# Patient Record
Sex: FEMALE | Race: ASIAN | NOT HISPANIC OR LATINO | URBAN - METROPOLITAN AREA
[De-identification: names, ages, dates, MRNs, and addresses within clinical notes are randomized per-mention and may not be internally consistent; named-entity substitution may affect disease eponyms.]

---

## 2023-11-07 ENCOUNTER — EMERGENCY (EMERGENCY)
Facility: HOSPITAL | Age: 33
LOS: 1 days | Discharge: HOME | End: 2023-11-07
Attending: EMERGENCY MEDICINE | Admitting: EMERGENCY MEDICINE
Payer: MEDICAID

## 2023-11-07 VITALS
SYSTOLIC BLOOD PRESSURE: 100 MMHG | OXYGEN SATURATION: 100 % | HEART RATE: 78 BPM | DIASTOLIC BLOOD PRESSURE: 59 MMHG | RESPIRATION RATE: 18 BRPM | TEMPERATURE: 99 F

## 2023-11-07 PROCEDURE — 99284 EMERGENCY DEPT VISIT MOD MDM: CPT

## 2023-11-07 NOTE — ED ADULT TRIAGE NOTE - CHIEF COMPLAINT QUOTE
right sided ab pain/cramping since last night with 1 episode of spotting today, PT is about one month pregnant. Of Note PT also reports a rash with hives that comes every morning and lessens throughout the day x 4 days. No Hx reported.

## 2023-11-08 VITALS
DIASTOLIC BLOOD PRESSURE: 81 MMHG | SYSTOLIC BLOOD PRESSURE: 94 MMHG | TEMPERATURE: 99 F | OXYGEN SATURATION: 100 % | HEART RATE: 65 BPM | RESPIRATION RATE: 16 BRPM

## 2023-11-08 LAB
ALBUMIN SERPL ELPH-MCNC: 4.1 G/DL — SIGNIFICANT CHANGE UP (ref 3.3–5)
ALBUMIN SERPL ELPH-MCNC: 4.1 G/DL — SIGNIFICANT CHANGE UP (ref 3.3–5)
ALP SERPL-CCNC: 67 U/L — SIGNIFICANT CHANGE UP (ref 40–120)
ALP SERPL-CCNC: 67 U/L — SIGNIFICANT CHANGE UP (ref 40–120)
ALT FLD-CCNC: 25 U/L — SIGNIFICANT CHANGE UP (ref 4–33)
ALT FLD-CCNC: 25 U/L — SIGNIFICANT CHANGE UP (ref 4–33)
ANION GAP SERPL CALC-SCNC: 12 MMOL/L — SIGNIFICANT CHANGE UP (ref 7–14)
ANION GAP SERPL CALC-SCNC: 12 MMOL/L — SIGNIFICANT CHANGE UP (ref 7–14)
APPEARANCE UR: CLEAR — SIGNIFICANT CHANGE UP
APPEARANCE UR: CLEAR — SIGNIFICANT CHANGE UP
AST SERPL-CCNC: 16 U/L — SIGNIFICANT CHANGE UP (ref 4–32)
AST SERPL-CCNC: 16 U/L — SIGNIFICANT CHANGE UP (ref 4–32)
BACTERIA # UR AUTO: NEGATIVE /HPF — SIGNIFICANT CHANGE UP
BACTERIA # UR AUTO: NEGATIVE /HPF — SIGNIFICANT CHANGE UP
BASOPHILS # BLD AUTO: 0.06 K/UL — SIGNIFICANT CHANGE UP (ref 0–0.2)
BASOPHILS # BLD AUTO: 0.06 K/UL — SIGNIFICANT CHANGE UP (ref 0–0.2)
BASOPHILS NFR BLD AUTO: 0.5 % — SIGNIFICANT CHANGE UP (ref 0–2)
BASOPHILS NFR BLD AUTO: 0.5 % — SIGNIFICANT CHANGE UP (ref 0–2)
BILIRUB SERPL-MCNC: <0.2 MG/DL — SIGNIFICANT CHANGE UP (ref 0.2–1.2)
BILIRUB SERPL-MCNC: <0.2 MG/DL — SIGNIFICANT CHANGE UP (ref 0.2–1.2)
BILIRUB UR-MCNC: NEGATIVE — SIGNIFICANT CHANGE UP
BILIRUB UR-MCNC: NEGATIVE — SIGNIFICANT CHANGE UP
BLD GP AB SCN SERPL QL: NEGATIVE — SIGNIFICANT CHANGE UP
BLD GP AB SCN SERPL QL: NEGATIVE — SIGNIFICANT CHANGE UP
BUN SERPL-MCNC: 13 MG/DL — SIGNIFICANT CHANGE UP (ref 7–23)
BUN SERPL-MCNC: 13 MG/DL — SIGNIFICANT CHANGE UP (ref 7–23)
CALCIUM SERPL-MCNC: 9.1 MG/DL — SIGNIFICANT CHANGE UP (ref 8.4–10.5)
CALCIUM SERPL-MCNC: 9.1 MG/DL — SIGNIFICANT CHANGE UP (ref 8.4–10.5)
CAST: 0 /LPF — SIGNIFICANT CHANGE UP (ref 0–4)
CAST: 0 /LPF — SIGNIFICANT CHANGE UP (ref 0–4)
CHLORIDE SERPL-SCNC: 100 MMOL/L — SIGNIFICANT CHANGE UP (ref 98–107)
CHLORIDE SERPL-SCNC: 100 MMOL/L — SIGNIFICANT CHANGE UP (ref 98–107)
CO2 SERPL-SCNC: 23 MMOL/L — SIGNIFICANT CHANGE UP (ref 22–31)
CO2 SERPL-SCNC: 23 MMOL/L — SIGNIFICANT CHANGE UP (ref 22–31)
COLOR SPEC: YELLOW — SIGNIFICANT CHANGE UP
COLOR SPEC: YELLOW — SIGNIFICANT CHANGE UP
CREAT SERPL-MCNC: 0.54 MG/DL — SIGNIFICANT CHANGE UP (ref 0.5–1.3)
CREAT SERPL-MCNC: 0.54 MG/DL — SIGNIFICANT CHANGE UP (ref 0.5–1.3)
DIFF PNL FLD: NEGATIVE — SIGNIFICANT CHANGE UP
DIFF PNL FLD: NEGATIVE — SIGNIFICANT CHANGE UP
EGFR: 127 ML/MIN/1.73M2 — SIGNIFICANT CHANGE UP
EGFR: 127 ML/MIN/1.73M2 — SIGNIFICANT CHANGE UP
EOSINOPHIL # BLD AUTO: 0.17 K/UL — SIGNIFICANT CHANGE UP (ref 0–0.5)
EOSINOPHIL # BLD AUTO: 0.17 K/UL — SIGNIFICANT CHANGE UP (ref 0–0.5)
EOSINOPHIL NFR BLD AUTO: 1.4 % — SIGNIFICANT CHANGE UP (ref 0–6)
EOSINOPHIL NFR BLD AUTO: 1.4 % — SIGNIFICANT CHANGE UP (ref 0–6)
GLUCOSE SERPL-MCNC: 119 MG/DL — HIGH (ref 70–99)
GLUCOSE SERPL-MCNC: 119 MG/DL — HIGH (ref 70–99)
GLUCOSE UR QL: NEGATIVE MG/DL — SIGNIFICANT CHANGE UP
GLUCOSE UR QL: NEGATIVE MG/DL — SIGNIFICANT CHANGE UP
HCG SERPL-ACNC: SIGNIFICANT CHANGE UP MIU/ML
HCG SERPL-ACNC: SIGNIFICANT CHANGE UP MIU/ML
HCT VFR BLD CALC: 33.9 % — LOW (ref 34.5–45)
HCT VFR BLD CALC: 33.9 % — LOW (ref 34.5–45)
HGB BLD-MCNC: 11.4 G/DL — LOW (ref 11.5–15.5)
HGB BLD-MCNC: 11.4 G/DL — LOW (ref 11.5–15.5)
IANC: 7.83 K/UL — HIGH (ref 1.8–7.4)
IANC: 7.83 K/UL — HIGH (ref 1.8–7.4)
IMM GRANULOCYTES NFR BLD AUTO: 0.3 % — SIGNIFICANT CHANGE UP (ref 0–0.9)
IMM GRANULOCYTES NFR BLD AUTO: 0.3 % — SIGNIFICANT CHANGE UP (ref 0–0.9)
KETONES UR-MCNC: NEGATIVE MG/DL — SIGNIFICANT CHANGE UP
KETONES UR-MCNC: NEGATIVE MG/DL — SIGNIFICANT CHANGE UP
LEUKOCYTE ESTERASE UR-ACNC: NEGATIVE — SIGNIFICANT CHANGE UP
LEUKOCYTE ESTERASE UR-ACNC: NEGATIVE — SIGNIFICANT CHANGE UP
LYMPHOCYTES # BLD AUTO: 26.8 % — SIGNIFICANT CHANGE UP (ref 13–44)
LYMPHOCYTES # BLD AUTO: 26.8 % — SIGNIFICANT CHANGE UP (ref 13–44)
LYMPHOCYTES # BLD AUTO: 3.23 K/UL — SIGNIFICANT CHANGE UP (ref 1–3.3)
LYMPHOCYTES # BLD AUTO: 3.23 K/UL — SIGNIFICANT CHANGE UP (ref 1–3.3)
MCHC RBC-ENTMCNC: 29.8 PG — SIGNIFICANT CHANGE UP (ref 27–34)
MCHC RBC-ENTMCNC: 29.8 PG — SIGNIFICANT CHANGE UP (ref 27–34)
MCHC RBC-ENTMCNC: 33.6 GM/DL — SIGNIFICANT CHANGE UP (ref 32–36)
MCHC RBC-ENTMCNC: 33.6 GM/DL — SIGNIFICANT CHANGE UP (ref 32–36)
MCV RBC AUTO: 88.5 FL — SIGNIFICANT CHANGE UP (ref 80–100)
MCV RBC AUTO: 88.5 FL — SIGNIFICANT CHANGE UP (ref 80–100)
MONOCYTES # BLD AUTO: 0.72 K/UL — SIGNIFICANT CHANGE UP (ref 0–0.9)
MONOCYTES # BLD AUTO: 0.72 K/UL — SIGNIFICANT CHANGE UP (ref 0–0.9)
MONOCYTES NFR BLD AUTO: 6 % — SIGNIFICANT CHANGE UP (ref 2–14)
MONOCYTES NFR BLD AUTO: 6 % — SIGNIFICANT CHANGE UP (ref 2–14)
NEUTROPHILS # BLD AUTO: 7.83 K/UL — HIGH (ref 1.8–7.4)
NEUTROPHILS # BLD AUTO: 7.83 K/UL — HIGH (ref 1.8–7.4)
NEUTROPHILS NFR BLD AUTO: 65 % — SIGNIFICANT CHANGE UP (ref 43–77)
NEUTROPHILS NFR BLD AUTO: 65 % — SIGNIFICANT CHANGE UP (ref 43–77)
NITRITE UR-MCNC: NEGATIVE — SIGNIFICANT CHANGE UP
NITRITE UR-MCNC: NEGATIVE — SIGNIFICANT CHANGE UP
NRBC # BLD: 0 /100 WBCS — SIGNIFICANT CHANGE UP (ref 0–0)
NRBC # BLD: 0 /100 WBCS — SIGNIFICANT CHANGE UP (ref 0–0)
NRBC # FLD: 0 K/UL — SIGNIFICANT CHANGE UP (ref 0–0)
NRBC # FLD: 0 K/UL — SIGNIFICANT CHANGE UP (ref 0–0)
PH UR: 6.5 — SIGNIFICANT CHANGE UP (ref 5–8)
PH UR: 6.5 — SIGNIFICANT CHANGE UP (ref 5–8)
PLATELET # BLD AUTO: 286 K/UL — SIGNIFICANT CHANGE UP (ref 150–400)
PLATELET # BLD AUTO: 286 K/UL — SIGNIFICANT CHANGE UP (ref 150–400)
POTASSIUM SERPL-MCNC: 3.8 MMOL/L — SIGNIFICANT CHANGE UP (ref 3.5–5.3)
POTASSIUM SERPL-MCNC: 3.8 MMOL/L — SIGNIFICANT CHANGE UP (ref 3.5–5.3)
POTASSIUM SERPL-SCNC: 3.8 MMOL/L — SIGNIFICANT CHANGE UP (ref 3.5–5.3)
POTASSIUM SERPL-SCNC: 3.8 MMOL/L — SIGNIFICANT CHANGE UP (ref 3.5–5.3)
PROT SERPL-MCNC: 7.1 G/DL — SIGNIFICANT CHANGE UP (ref 6–8.3)
PROT SERPL-MCNC: 7.1 G/DL — SIGNIFICANT CHANGE UP (ref 6–8.3)
PROT UR-MCNC: NEGATIVE MG/DL — SIGNIFICANT CHANGE UP
PROT UR-MCNC: NEGATIVE MG/DL — SIGNIFICANT CHANGE UP
RBC # BLD: 3.83 M/UL — SIGNIFICANT CHANGE UP (ref 3.8–5.2)
RBC # BLD: 3.83 M/UL — SIGNIFICANT CHANGE UP (ref 3.8–5.2)
RBC # FLD: 13 % — SIGNIFICANT CHANGE UP (ref 10.3–14.5)
RBC # FLD: 13 % — SIGNIFICANT CHANGE UP (ref 10.3–14.5)
RBC CASTS # UR COMP ASSIST: 0 /HPF — SIGNIFICANT CHANGE UP (ref 0–4)
RBC CASTS # UR COMP ASSIST: 0 /HPF — SIGNIFICANT CHANGE UP (ref 0–4)
RH IG SCN BLD-IMP: POSITIVE — SIGNIFICANT CHANGE UP
RH IG SCN BLD-IMP: POSITIVE — SIGNIFICANT CHANGE UP
SODIUM SERPL-SCNC: 135 MMOL/L — SIGNIFICANT CHANGE UP (ref 135–145)
SODIUM SERPL-SCNC: 135 MMOL/L — SIGNIFICANT CHANGE UP (ref 135–145)
SP GR SPEC: 1.01 — SIGNIFICANT CHANGE UP (ref 1–1.03)
SP GR SPEC: 1.01 — SIGNIFICANT CHANGE UP (ref 1–1.03)
SQUAMOUS # UR AUTO: 1 /HPF — SIGNIFICANT CHANGE UP (ref 0–5)
SQUAMOUS # UR AUTO: 1 /HPF — SIGNIFICANT CHANGE UP (ref 0–5)
UROBILINOGEN FLD QL: 0.2 MG/DL — SIGNIFICANT CHANGE UP (ref 0.2–1)
UROBILINOGEN FLD QL: 0.2 MG/DL — SIGNIFICANT CHANGE UP (ref 0.2–1)
WBC # BLD: 12.05 K/UL — HIGH (ref 3.8–10.5)
WBC # BLD: 12.05 K/UL — HIGH (ref 3.8–10.5)
WBC # FLD AUTO: 12.05 K/UL — HIGH (ref 3.8–10.5)
WBC # FLD AUTO: 12.05 K/UL — HIGH (ref 3.8–10.5)
WBC UR QL: 0 /HPF — SIGNIFICANT CHANGE UP (ref 0–5)
WBC UR QL: 0 /HPF — SIGNIFICANT CHANGE UP (ref 0–5)

## 2023-11-08 PROCEDURE — 76817 TRANSVAGINAL US OBSTETRIC: CPT | Mod: 26

## 2023-11-08 NOTE — ED ADULT NURSE REASSESSMENT NOTE - NS ED NURSE REASSESS COMMENT FT1
Patient resting in stretcher, A&O x 4, ambulatory without assistance, offers no complaints of pain or discomfort at this time. RR equal and unlabored, speaking in clear sentences, VS stable. Safety measures maintained, family member at bedside, awaiting urine sample at this time.
Pt resting in stretcher, A&O x 4, offers no complaints of pain or discomfort at this time. RR equal and unlabored, speaking in clear sentences. BP 94/81, denies lightheadedness, dizziness, HA, chest pain, numbness/tingling. Safety measures maintained, comfort provided, family member at bedside, awaiting results at this time.

## 2023-11-08 NOTE — ED ADULT NURSE NOTE - OBJECTIVE STATEMENT
Breakcoverage RN Note: REX. Received pt to TRB.  at bedside. Pt reports " I am pregnant lmp 9/21, since yesterday am I have cramping and light spotting. Then I also notice this rash that has been coming and going over night into morning...it  may come on leg then goes then pops up on chest, goes away....this is going on 3 days...it it itchy when present. Right now it is not present." vss positioned for comfort.

## 2023-11-08 NOTE — ED PROVIDER NOTE - CLINICAL SUMMARY MEDICAL DECISION MAKING FREE TEXT BOX
32-year-old female  LMP 2023 that presents with abdominal pain in the right lower quadrant right pelvic area.  No rebound or guarding and nontender on exam.  Appears well.  Due to patient's recent finding of home pregnancy positive never had imaging the concern is #1 for ectopic pregnancy versus UTI versus yeast infection versus appendicitis given the location of the pain.  Although it is lower in the pelvic region.  Unlikely torsion may be cyst or ruptured cyst.  At this time will obtain transvaginal ultrasound and labs and urinalysis and culture and reassess.  Patient pain is minimal at this time and does not appear to be in acute distress with normal vital signs.  Of note patient's first pregnancy was complicated by gestational diabetes but patient is no longer on any medications as it resolved after her delivery.

## 2023-11-08 NOTE — ED PROVIDER NOTE - PHYSICAL EXAMINATION
Well-appearing no acute distress abdomen soft nontender with no CVA tenderness bilaterally and negative McBurney's negative Oliva's.  No rebound or guarding.  No masses palpable.  Patient appears comfortable otherwise.

## 2023-11-08 NOTE — ED PROVIDER NOTE - OBJECTIVE STATEMENT
32-year-old female G2, P1 LMP September 21, 2023 that presents with abdominal pain.  Patient states that she recently found out she was pregnant by home pregnancy and went to the doctor and found out confirmatory that she is pregnant but has not had any imaging.  Today she started having some abdominal pain in the morning right sided lower quadrant minimally dull achy nonradiating.  She did not think anything of it then later in the afternoon around 4-5 which is approximately 8 hours prior to arrival started having some vaginal bleeding when she wiped associated with continued pelvic cramping so came to the ED for evaluation.  Denies any nausea, vomiting, diarrhea, fevers, chills.  No recent trauma or rashes.  Patient states that she feels like she has a urinary tract infection but has itching in her pelvic region and white discharge.  Never had an STI in the past.  Sexually active with just one male partner her  who is at bedside as well.  Non-smoker no drinking or drugs.

## 2023-11-08 NOTE — ED PROVIDER NOTE - PROGRESS NOTE DETAILS
JOSE L: Patient was reevaluated and has no pain at this time.  Her labs were obtained and her hCG is 21152.0.  UA is negative for infections or blood at this time.  Patient is a positive.  Her ultrasound shows single IUP P with a gestational age estimated at 6 weeks and 5 days by crown-rump length.  There is a small subchorionic hematoma which was related to the patient.  Information was given to her and her  to review.  They do have an OB/GYN that they can follow-up with outpatient.  Will discharge patient at this time her condition is most likely secondary to normal pregnancy.  I explained to patient that if she starts having fevers or chills or GI issues due to her right lower quadrant pain concern for appendicitis that she should return to the hospital for further treatment or evaluation but at this time due to her fetus and no pain or symptoms and no recent fevers unlikely appendicitis.  Unlikely UTI.  Most likely secondary to normal pregnancy.

## 2023-11-08 NOTE — ED ADULT NURSE NOTE - CAS EDP DISCH TYPE
Ice 15 to 20 minutes 2-3 times daily for the next 48 hours.  It is very important that you keep the hand above your heart elevated to decrease swelling.  Follow-up with the bone specialist listed above within the week.  Take prescribed medication as directed.  Return to the emergency department for any other problems/concerns.  
Home

## 2023-11-08 NOTE — ED PROVIDER NOTE - NSFOLLOWUPINSTRUCTIONS_ED_ALL_ED_FT
You are seen in the emergency department for pelvic pain.  We obtained labs and imaging as well as check your urine.  Your urine shows no infections and your blood test do show that you are pregnant and your ultrasound came back showing that you are pregnant around 6 weeks and 5 days with the baby in the uterus.  We also found that you have something called a subchorionic hematoma which you should read about and follow-up with your OB/GYN for this.  There is nothing to do at this time regarding the subchorionic hematoma it is just monitoring to make sure that it does not worsening over time which can lead to bleeding.    Your blood type is a positive please remember this for the future.    Please take your results from today's testing in the emergency department follow-up with your OB/GYN as soon as possible.    If you start having fevers, chills, nausea, vomiting, diarrhea or worsening pain please return to the emergency department for further evaluation.

## 2023-11-08 NOTE — ED PROVIDER NOTE - PATIENT PORTAL LINK FT
You can access the FollowMyHealth Patient Portal offered by [facility] by registering at the following website: [email or web address]. By joining [employer]’s FollowMyHealth portal, you will also be able to view your health information using other applications (apps) compatible with our system.

## 2023-11-09 LAB
CULTURE RESULTS: SIGNIFICANT CHANGE UP
CULTURE RESULTS: SIGNIFICANT CHANGE UP
SPECIMEN SOURCE: SIGNIFICANT CHANGE UP
SPECIMEN SOURCE: SIGNIFICANT CHANGE UP

## 2024-01-08 ENCOUNTER — APPOINTMENT (OUTPATIENT)
Dept: OBGYN | Facility: CLINIC | Age: 34
End: 2024-01-08
Payer: MEDICAID

## 2024-01-08 ENCOUNTER — TRANSCRIPTION ENCOUNTER (OUTPATIENT)
Age: 34
End: 2024-01-08

## 2024-01-08 ENCOUNTER — OUTPATIENT (OUTPATIENT)
Dept: OUTPATIENT SERVICES | Facility: HOSPITAL | Age: 34
LOS: 1 days | End: 2024-01-08
Payer: MEDICAID

## 2024-01-08 VITALS
BODY MASS INDEX: 25.03 KG/M2 | RESPIRATION RATE: 18 BRPM | OXYGEN SATURATION: 99 % | DIASTOLIC BLOOD PRESSURE: 68 MMHG | WEIGHT: 136 LBS | HEIGHT: 62 IN | TEMPERATURE: 97.8 F | SYSTOLIC BLOOD PRESSURE: 100 MMHG | HEART RATE: 68 BPM

## 2024-01-08 DIAGNOSIS — Z34.00 ENCOUNTER FOR SUPERVISION OF NORMAL FIRST PREGNANCY, UNSPECIFIED TRIMESTER: ICD-10-CM

## 2024-01-08 PROCEDURE — 86803 HEPATITIS C AB TEST: CPT

## 2024-01-08 PROCEDURE — 99204 OFFICE O/P NEW MOD 45 MIN: CPT | Mod: TH,25

## 2024-01-08 PROCEDURE — 86901 BLOOD TYPING SEROLOGIC RH(D): CPT

## 2024-01-08 PROCEDURE — 36415 COLL VENOUS BLD VENIPUNCTURE: CPT

## 2024-01-08 PROCEDURE — 86780 TREPONEMA PALLIDUM: CPT

## 2024-01-08 PROCEDURE — 87340 HEPATITIS B SURFACE AG IA: CPT

## 2024-01-08 PROCEDURE — 87800 DETECT AGNT MULT DNA DIREC: CPT

## 2024-01-08 PROCEDURE — 86787 VARICELLA-ZOSTER ANTIBODY: CPT

## 2024-01-08 PROCEDURE — 81243 FMR1 GEN ALY DETC ABNL ALLEL: CPT

## 2024-01-08 PROCEDURE — 81329 SMN1 GENE DOS/DELETION ALYS: CPT

## 2024-01-08 PROCEDURE — 87591 N.GONORRHOEAE DNA AMP PROB: CPT

## 2024-01-08 PROCEDURE — 86762 RUBELLA ANTIBODY: CPT

## 2024-01-08 PROCEDURE — 86480 TB TEST CELL IMMUN MEASURE: CPT

## 2024-01-08 PROCEDURE — 84439 ASSAY OF FREE THYROXINE: CPT

## 2024-01-08 PROCEDURE — 83020 HEMOGLOBIN ELECTROPHORESIS: CPT

## 2024-01-08 PROCEDURE — 81220 CFTR GENE COM VARIANTS: CPT

## 2024-01-08 PROCEDURE — 81420 FETAL CHRMOML ANEUPLOIDY: CPT

## 2024-01-08 PROCEDURE — 86900 BLOOD TYPING SEROLOGIC ABO: CPT

## 2024-01-08 PROCEDURE — G0463: CPT

## 2024-01-08 PROCEDURE — 86765 RUBEOLA ANTIBODY: CPT

## 2024-01-08 PROCEDURE — 86850 RBC ANTIBODY SCREEN: CPT

## 2024-01-08 PROCEDURE — 87491 CHLMYD TRACH DNA AMP PROBE: CPT

## 2024-01-08 PROCEDURE — 83655 ASSAY OF LEAD: CPT

## 2024-01-08 PROCEDURE — 83036 HEMOGLOBIN GLYCOSYLATED A1C: CPT

## 2024-01-08 PROCEDURE — 81025 URINE PREGNANCY TEST: CPT

## 2024-01-08 PROCEDURE — 81003 URINALYSIS AUTO W/O SCOPE: CPT

## 2024-01-08 PROCEDURE — 84443 ASSAY THYROID STIM HORMONE: CPT

## 2024-01-08 PROCEDURE — 82306 VITAMIN D 25 HYDROXY: CPT

## 2024-01-08 PROCEDURE — 87624 HPV HI-RISK TYP POOLED RSLT: CPT

## 2024-01-08 PROCEDURE — 87086 URINE CULTURE/COLONY COUNT: CPT

## 2024-01-08 PROCEDURE — 85025 COMPLETE CBC W/AUTO DIFF WBC: CPT

## 2024-01-08 PROCEDURE — 87389 HIV-1 AG W/HIV-1&-2 AB AG IA: CPT

## 2024-01-08 RX ORDER — PRENATAL VIT 108/IRON,CB/FOLIC 30 MG-1 MG
30-1 TABLET ORAL
Qty: 30 | Refills: 11 | Status: ACTIVE | COMMUNITY
Start: 2024-01-08 | End: 1900-01-01

## 2024-01-09 DIAGNOSIS — Z3A.15 15 WEEKS GESTATION OF PREGNANCY: ICD-10-CM

## 2024-01-09 DIAGNOSIS — N76.1 SUBACUTE AND CHRONIC VAGINITIS: ICD-10-CM

## 2024-01-09 DIAGNOSIS — Z11.3 ENCOUNTER FOR SCREENING FOR INFECTIONS WITH A PREDOMINANTLY SEXUAL MODE OF TRANSMISSION: ICD-10-CM

## 2024-01-09 DIAGNOSIS — Z12.4 ENCOUNTER FOR SCREENING FOR MALIGNANT NEOPLASM OF CERVIX: ICD-10-CM

## 2024-01-09 DIAGNOSIS — Z12.39 ENCOUNTER FOR OTHER SCREENING FOR MALIGNANT NEOPLASM OF BREAST: ICD-10-CM

## 2024-01-09 LAB
25(OH)D3 SERPL-MCNC: 26.8 NG/ML
BASOPHILS # BLD AUTO: 0.02 K/UL
BASOPHILS NFR BLD AUTO: 0.2 %
CANDIDA VAG CYTO: NOT DETECTED
EOSINOPHIL # BLD AUTO: 0.07 K/UL
EOSINOPHIL NFR BLD AUTO: 0.8 %
ESTIMATED AVERAGE GLUCOSE: 103 MG/DL
G VAGINALIS+PREV SP MTYP VAG QL MICRO: NOT DETECTED
HBA1C MFR BLD HPLC: 5.2 %
HCT VFR BLD CALC: 35.2 %
HGB A MFR BLD: 97.5 %
HGB A2 MFR BLD: 2.5 %
HGB BLD-MCNC: 11.6 G/DL
HGB FRACT BLD-IMP: NORMAL
HIV1+2 AB SPEC QL IA.RAPID: NONREACTIVE
IMM GRANULOCYTES NFR BLD AUTO: 0.3 %
LYMPHOCYTES # BLD AUTO: 2.26 K/UL
LYMPHOCYTES NFR BLD AUTO: 24.7 %
MAN DIFF?: NORMAL
MCHC RBC-ENTMCNC: 30.3 PG
MCHC RBC-ENTMCNC: 33 GM/DL
MCV RBC AUTO: 91.9 FL
MONOCYTES # BLD AUTO: 0.39 K/UL
MONOCYTES NFR BLD AUTO: 4.3 %
NEUTROPHILS # BLD AUTO: 6.37 K/UL
NEUTROPHILS NFR BLD AUTO: 69.7 %
PLATELET # BLD AUTO: 242 K/UL
RBC # BLD: 3.83 M/UL
RBC # FLD: 13.4 %
T PALLIDUM AB SER QL IA: NEGATIVE
T VAGINALIS VAG QL WET PREP: NOT DETECTED
T4 FREE SERPL-MCNC: 0.9 NG/DL
TSH SERPL-ACNC: 2.51 UIU/ML
WBC # FLD AUTO: 9.14 K/UL

## 2024-01-09 RX ORDER — MULTIVIT-MIN/FOLIC/VIT K/LYCOP 400-300MCG
28-0.8 TABLET ORAL
Refills: 0 | Status: COMPLETED | COMMUNITY
End: 2024-01-09

## 2024-01-10 LAB
ABO + RH PNL BLD: NORMAL
BLD GP AB SCN SERPL QL: NORMAL
C TRACH RRNA SPEC QL NAA+PROBE: NOT DETECTED
HBV SURFACE AG SER QL: NONREACTIVE
HCV AB SER QL: NONREACTIVE
HCV S/CO RATIO: 0.11 S/CO
HPV HIGH+LOW RISK DNA PNL CVX: NOT DETECTED
LEAD BLD-MCNC: 1.4 UG/DL
MEV IGG FLD QL IA: 115 AU/ML
MEV IGG+IGM SER-IMP: POSITIVE
N GONORRHOEA RRNA SPEC QL NAA+PROBE: NOT DETECTED
RUBV IGG FLD-ACNC: 31.8 INDEX
RUBV IGG SER-IMP: POSITIVE
SOURCE TP AMPLIFICATION: NORMAL
VZV AB TITR SER: POSITIVE
VZV IGG SER IF-ACNC: 449.1 INDEX

## 2024-01-11 LAB
BACTERIA UR CULT: NORMAL
CYTOLOGY CVX/VAG DOC THIN PREP: NORMAL
M TB IFN-G BLD-IMP: NEGATIVE
QUANTIFERON TB PLUS MITOGEN MINUS NIL: >10 IU/ML
QUANTIFERON TB PLUS NIL: 0.01 IU/ML
QUANTIFERON TB PLUS TB1 MINUS NIL: 0.01 IU/ML
QUANTIFERON TB PLUS TB2 MINUS NIL: 0.01 IU/ML

## 2024-01-16 LAB
AR GENE MUT ANL BLD/T: NORMAL
CHROMOSOME13 INTERPRETATION: NORMAL
CHROMOSOME13 TEST RESULT: NORMAL
CHROMOSOME18 INTERPRETATION: NORMAL
CHROMOSOME18 TEST RESULT: NORMAL
CHROMOSOME21 INTERPRETATION: NORMAL
CHROMOSOME21 TEST RESULT: NORMAL
FETAL FRACTION: NORMAL
PERFORMANCE AND LIMITATIONS: NORMAL
SEX CHROMOSOME INTERPRETATION: NORMAL
SEX CHROMOSOME TEST RESULT: NORMAL
VERIFI PRENATAL TEST: NOT DETECTED

## 2024-01-17 ENCOUNTER — ASOB RESULT (OUTPATIENT)
Age: 34
End: 2024-01-17

## 2024-01-17 ENCOUNTER — APPOINTMENT (OUTPATIENT)
Dept: ANTEPARTUM | Facility: CLINIC | Age: 34
End: 2024-01-17
Payer: MEDICAID

## 2024-01-17 LAB — FMR1 GENE MUT ANL BLD/T: NORMAL

## 2024-01-17 PROCEDURE — 76805 OB US >/= 14 WKS SNGL FETUS: CPT

## 2024-01-20 LAB — CFTR MUT TESTED BLD/T: POSITIVE

## 2024-01-22 ENCOUNTER — NON-APPOINTMENT (OUTPATIENT)
Age: 34
End: 2024-01-22

## 2024-02-07 ENCOUNTER — APPOINTMENT (OUTPATIENT)
Dept: ANTEPARTUM | Facility: CLINIC | Age: 34
End: 2024-02-07
Payer: MEDICAID

## 2024-02-07 ENCOUNTER — ASOB RESULT (OUTPATIENT)
Age: 34
End: 2024-02-07

## 2024-02-07 ENCOUNTER — APPOINTMENT (OUTPATIENT)
Dept: OBGYN | Facility: CLINIC | Age: 34
End: 2024-02-07
Payer: MEDICAID

## 2024-02-07 ENCOUNTER — OUTPATIENT (OUTPATIENT)
Dept: OUTPATIENT SERVICES | Facility: HOSPITAL | Age: 34
LOS: 1 days | End: 2024-02-07
Payer: MEDICAID

## 2024-02-07 VITALS
DIASTOLIC BLOOD PRESSURE: 65 MMHG | TEMPERATURE: 98 F | SYSTOLIC BLOOD PRESSURE: 94 MMHG | OXYGEN SATURATION: 98 % | BODY MASS INDEX: 25.6 KG/M2 | HEART RATE: 78 BPM | WEIGHT: 139.13 LBS | RESPIRATION RATE: 18 BRPM | HEIGHT: 62 IN

## 2024-02-07 DIAGNOSIS — Z98.890 OTHER SPECIFIED POSTPROCEDURAL STATES: ICD-10-CM

## 2024-02-07 DIAGNOSIS — Z11.3 ENCOUNTER FOR SCREENING FOR INFECTIONS WITH A PREDOMINANTLY SEXUAL MODE OF TRANSMISSION: ICD-10-CM

## 2024-02-07 DIAGNOSIS — N76.1 SUBACUTE AND CHRONIC VAGINITIS: ICD-10-CM

## 2024-02-07 DIAGNOSIS — Z34.00 ENCOUNTER FOR SUPERVISION OF NORMAL FIRST PREGNANCY, UNSPECIFIED TRIMESTER: ICD-10-CM

## 2024-02-07 DIAGNOSIS — Z3A.15 15 WEEKS GESTATION OF PREGNANCY: ICD-10-CM

## 2024-02-07 DIAGNOSIS — Z12.39 ENCOUNTER FOR OTHER SCREENING FOR MALIGNANT NEOPLASM OF BREAST: ICD-10-CM

## 2024-02-07 PROCEDURE — G0463: CPT

## 2024-02-07 PROCEDURE — 82105 ALPHA-FETOPROTEIN SERUM: CPT

## 2024-02-07 PROCEDURE — 76811 OB US DETAILED SNGL FETUS: CPT

## 2024-02-07 PROCEDURE — 81003 URINALYSIS AUTO W/O SCOPE: CPT

## 2024-02-07 PROCEDURE — 36415 COLL VENOUS BLD VENIPUNCTURE: CPT

## 2024-02-07 PROCEDURE — 99214 OFFICE O/P EST MOD 30 MIN: CPT | Mod: TH,25

## 2024-02-08 DIAGNOSIS — E55.9 VITAMIN D DEFICIENCY, UNSPECIFIED: ICD-10-CM

## 2024-02-08 DIAGNOSIS — Z34.92 ENCOUNTER FOR SUPERVISION OF NORMAL PREGNANCY, UNSPECIFIED, SECOND TRIMESTER: ICD-10-CM

## 2024-02-08 DIAGNOSIS — Z86.32 PERSONAL HISTORY OF GESTATIONAL DIABETES: ICD-10-CM

## 2024-02-14 LAB
AFP MOM: 0.6
AFP VALUE: 35.5 NG/ML
ALPHA FETOPROTEIN SERUM COMMENT: NORMAL
ALPHA FETOPROTEIN SERUM INTERPRETATION: NORMAL
ALPHA FETOPROTEIN SERUM RESULTS: NORMAL
ALPHA FETOPROTEIN SERUM TEST RESULTS: NORMAL
GESTATIONAL AGE BASED ON: NORMAL
GESTATIONAL AGE ON COLLECTION DATE: 19.9 WEEKS
INSULIN DEP DIABETES: NO
MATERNAL AGE AT EDD AFP: 31.5 YR
MULTIPLE GESTATION: NO
OSBR RISK 1 IN: NORMAL
RACE: NORMAL
WEIGHT AFP: 139 LBS

## 2024-02-29 ENCOUNTER — APPOINTMENT (OUTPATIENT)
Dept: INTERNAL MEDICINE | Facility: CLINIC | Age: 34
End: 2024-02-29
Payer: MEDICAID

## 2024-02-29 VITALS
HEIGHT: 62 IN | WEIGHT: 144 LBS | TEMPERATURE: 97 F | OXYGEN SATURATION: 99 % | SYSTOLIC BLOOD PRESSURE: 95 MMHG | HEART RATE: 90 BPM | RESPIRATION RATE: 16 BRPM | DIASTOLIC BLOOD PRESSURE: 65 MMHG | BODY MASS INDEX: 26.5 KG/M2

## 2024-02-29 DIAGNOSIS — Z00.00 ENCOUNTER FOR GENERAL ADULT MEDICAL EXAMINATION W/OUT ABNORMAL FINDINGS: ICD-10-CM

## 2024-02-29 PROCEDURE — 99385 PREV VISIT NEW AGE 18-39: CPT

## 2024-02-29 NOTE — PHYSICAL EXAM
[No Acute Distress] : no acute distress [Well-Appearing] : well-appearing [No Respiratory Distress] : no respiratory distress  [No Lymphadenopathy] : no lymphadenopathy [Clear to Auscultation] : lungs were clear to auscultation bilaterally [No Accessory Muscle Use] : no accessory muscle use [Normal Rate] : normal rate  [Soft] : abdomen soft [Regular Rhythm] : with a regular rhythm [Non Tender] : non-tender [Non-distended] : non-distended [Normal Affect] : the affect was normal [Coordination Grossly Intact] : coordination grossly intact [Normal Insight/Judgement] : insight and judgment were intact [de-identified] : L lower shin with circumscribed area of erythema with excoriations

## 2024-02-29 NOTE — HEALTH RISK ASSESSMENT
[1] : 2) Feeling down, depressed, or hopeless for several days (1) [0] : 1) Little interest or pleasure doing things: Not at all (0) [PHQ-2 Negative - No further assessment needed] : PHQ-2 Negative - No further assessment needed

## 2024-02-29 NOTE — HISTORY OF PRESENT ILLNESS
[de-identified] : 32 y/o F with Vit D insufficiency presenting to establish care. Currently 23 weeks pregnant with 2nd child. Feeling well.  + sleep disturbance now during pregnancy. Able to fall asleep but wakes up during night + L leg rash for past few months. Itchy. No recent travel.  Sx Hx: NA  Family Hx: Mom, dad: DM  Social Hx: denies smoking, alcohol, illicit drugs. Lives at home with  and child.

## 2024-02-29 NOTE — ASSESSMENT
[FreeTextEntry1] : .  34 y/o F with Vit D insufficiency presenting to establish care. CHPI as above.  # 23 weeks pregnant - feeling well, following with GYN   # Vit D insufficiency  - 1/2024 level 26; c/w D3 800 IU daily  # Sleep disturbance - discussed stress and lifestyle management  - rec discuss with OB regarding trial melatonin or Mg  # L leg rash; fungal, Majocchi granuloma? - suspect fungal in nature but appears to affect deeper tissue that would need PO meds which are not generally rec in pregnancy - rec derm eval for definitive diagnosis and tx  # HCM - recent labs reviewed - s/p flu vaccine - 1/2024 pap wnl

## 2024-03-11 ENCOUNTER — OUTPATIENT (OUTPATIENT)
Dept: OUTPATIENT SERVICES | Facility: HOSPITAL | Age: 34
LOS: 1 days | End: 2024-03-11
Payer: MEDICAID

## 2024-03-11 ENCOUNTER — APPOINTMENT (OUTPATIENT)
Dept: OBGYN | Facility: CLINIC | Age: 34
End: 2024-03-11
Payer: MEDICAID

## 2024-03-11 VITALS
BODY MASS INDEX: 26.68 KG/M2 | DIASTOLIC BLOOD PRESSURE: 68 MMHG | TEMPERATURE: 97 F | HEIGHT: 62 IN | OXYGEN SATURATION: 100 % | SYSTOLIC BLOOD PRESSURE: 101 MMHG | WEIGHT: 145 LBS | RESPIRATION RATE: 16 BRPM | HEART RATE: 81 BPM

## 2024-03-11 DIAGNOSIS — Z34.00 ENCOUNTER FOR SUPERVISION OF NORMAL FIRST PREGNANCY, UNSPECIFIED TRIMESTER: ICD-10-CM

## 2024-03-11 PROCEDURE — G0463: CPT

## 2024-03-11 PROCEDURE — 99214 OFFICE O/P EST MOD 30 MIN: CPT | Mod: TH

## 2024-03-11 PROCEDURE — 81003 URINALYSIS AUTO W/O SCOPE: CPT

## 2024-03-12 DIAGNOSIS — R21 RASH AND OTHER NONSPECIFIC SKIN ERUPTION: ICD-10-CM

## 2024-03-12 DIAGNOSIS — Z86.32 PERSONAL HISTORY OF GESTATIONAL DIABETES: ICD-10-CM

## 2024-03-12 DIAGNOSIS — Z14.1 CYSTIC FIBROSIS CARRIER: ICD-10-CM

## 2024-03-12 DIAGNOSIS — Z34.92 ENCOUNTER FOR SUPERVISION OF NORMAL PREGNANCY, UNSPECIFIED, SECOND TRIMESTER: ICD-10-CM

## 2024-03-12 DIAGNOSIS — E55.9 VITAMIN D DEFICIENCY, UNSPECIFIED: ICD-10-CM

## 2024-04-19 ENCOUNTER — APPOINTMENT (OUTPATIENT)
Dept: OBGYN | Facility: CLINIC | Age: 34
End: 2024-04-19
Payer: MEDICAID

## 2024-04-19 ENCOUNTER — OUTPATIENT (OUTPATIENT)
Dept: OUTPATIENT SERVICES | Facility: HOSPITAL | Age: 34
LOS: 1 days | End: 2024-04-19
Payer: MEDICAID

## 2024-04-19 ENCOUNTER — NON-APPOINTMENT (OUTPATIENT)
Age: 34
End: 2024-04-19

## 2024-04-19 VITALS
BODY MASS INDEX: 27.6 KG/M2 | TEMPERATURE: 97.7 F | DIASTOLIC BLOOD PRESSURE: 62 MMHG | HEART RATE: 87 BPM | OXYGEN SATURATION: 97 % | WEIGHT: 150 LBS | RESPIRATION RATE: 18 BRPM | SYSTOLIC BLOOD PRESSURE: 92 MMHG | HEIGHT: 62 IN

## 2024-04-19 DIAGNOSIS — Z34.00 ENCOUNTER FOR SUPERVISION OF NORMAL FIRST PREGNANCY, UNSPECIFIED TRIMESTER: ICD-10-CM

## 2024-04-19 DIAGNOSIS — Z34.92 ENCOUNTER FOR SUPERVISION OF NORMAL PREGNANCY, UNSPECIFIED, SECOND TRIMESTER: ICD-10-CM

## 2024-04-19 PROCEDURE — G0463: CPT

## 2024-04-19 PROCEDURE — 99214 OFFICE O/P EST MOD 30 MIN: CPT | Mod: TH,25

## 2024-04-19 PROCEDURE — 85025 COMPLETE CBC W/AUTO DIFF WBC: CPT

## 2024-04-19 PROCEDURE — 86780 TREPONEMA PALLIDUM: CPT

## 2024-04-19 PROCEDURE — 82950 GLUCOSE TEST: CPT

## 2024-04-19 PROCEDURE — 80053 COMPREHEN METABOLIC PANEL: CPT

## 2024-04-19 PROCEDURE — 90715 TDAP VACCINE 7 YRS/> IM: CPT

## 2024-04-19 PROCEDURE — 84156 ASSAY OF PROTEIN URINE: CPT

## 2024-04-19 PROCEDURE — 36415 COLL VENOUS BLD VENIPUNCTURE: CPT

## 2024-04-19 PROCEDURE — 81003 URINALYSIS AUTO W/O SCOPE: CPT

## 2024-04-19 PROCEDURE — 83036 HEMOGLOBIN GLYCOSYLATED A1C: CPT

## 2024-04-22 DIAGNOSIS — Z86.32 PERSONAL HISTORY OF GESTATIONAL DIABETES: ICD-10-CM

## 2024-04-22 DIAGNOSIS — Z34.93 ENCOUNTER FOR SUPERVISION OF NORMAL PREGNANCY, UNSPECIFIED, THIRD TRIMESTER: ICD-10-CM

## 2024-04-22 DIAGNOSIS — Z14.1 CYSTIC FIBROSIS CARRIER: ICD-10-CM

## 2024-04-22 DIAGNOSIS — Z71.85 ENCOUNTER FOR IMMUNIZATION SAFETY COUNSELING: ICD-10-CM

## 2024-04-22 DIAGNOSIS — R21 RASH AND OTHER NONSPECIFIC SKIN ERUPTION: ICD-10-CM

## 2024-04-22 DIAGNOSIS — Z23 ENCOUNTER FOR IMMUNIZATION: ICD-10-CM

## 2024-04-26 LAB
ALBUMIN SERPL ELPH-MCNC: 3.7 G/DL
ALP BLD-CCNC: 79 U/L
ALT SERPL-CCNC: 15 U/L
ANION GAP SERPL CALC-SCNC: 14 MMOL/L
AST SERPL-CCNC: 17 U/L
BASOPHILS # BLD AUTO: 0.04 K/UL
BASOPHILS NFR BLD AUTO: 0.5 %
BILIRUB SERPL-MCNC: 0.2 MG/DL
BUN SERPL-MCNC: 9 MG/DL
CALCIUM SERPL-MCNC: 8.5 MG/DL
CHLORIDE SERPL-SCNC: 103 MMOL/L
CO2 SERPL-SCNC: 20 MMOL/L
CREAT SERPL-MCNC: 0.44 MG/DL
CREAT SPEC-SCNC: 116 MG/DL
CREAT/PROT UR: 0.1 RATIO
EGFR: 133 ML/MIN/1.73M2
EOSINOPHIL # BLD AUTO: 0.09 K/UL
EOSINOPHIL NFR BLD AUTO: 1 %
ESTIMATED AVERAGE GLUCOSE: 108 MG/DL
GLUCOSE 1H P 50 G GLC PO SERPL-MCNC: 169 MG/DL
GLUCOSE SERPL-MCNC: 177 MG/DL
HBA1C MFR BLD HPLC: 5.4 %
HCT VFR BLD CALC: 34.1 %
HGB BLD-MCNC: 11 G/DL
IMM GRANULOCYTES NFR BLD AUTO: 0.6 %
LYMPHOCYTES # BLD AUTO: 1.95 K/UL
LYMPHOCYTES NFR BLD AUTO: 22.3 %
MAN DIFF?: NORMAL
MCHC RBC-ENTMCNC: 30.1 PG
MCHC RBC-ENTMCNC: 32.3 GM/DL
MCV RBC AUTO: 93.4 FL
MONOCYTES # BLD AUTO: 0.55 K/UL
MONOCYTES NFR BLD AUTO: 6.3 %
NEUTROPHILS # BLD AUTO: 6.06 K/UL
NEUTROPHILS NFR BLD AUTO: 69.3 %
PLATELET # BLD AUTO: 196 K/UL
POTASSIUM SERPL-SCNC: 3.7 MMOL/L
PROT SERPL-MCNC: 6 G/DL
PROT UR-MCNC: 16 MG/DL
RBC # BLD: 3.65 M/UL
RBC # FLD: 14.6 %
SODIUM SERPL-SCNC: 136 MMOL/L
T PALLIDUM AB SER QL IA: NEGATIVE
WBC # FLD AUTO: 8.74 K/UL

## 2024-04-30 ENCOUNTER — NON-APPOINTMENT (OUTPATIENT)
Age: 34
End: 2024-04-30

## 2024-05-03 ENCOUNTER — OUTPATIENT (OUTPATIENT)
Dept: OUTPATIENT SERVICES | Facility: HOSPITAL | Age: 34
LOS: 1 days | End: 2024-05-03

## 2024-05-03 DIAGNOSIS — Z34.90 ENCOUNTER FOR SUPERVISION OF NORMAL PREGNANCY, UNSPECIFIED, UNSPECIFIED TRIMESTER: ICD-10-CM

## 2024-05-05 ENCOUNTER — NON-APPOINTMENT (OUTPATIENT)
Age: 34
End: 2024-05-05

## 2024-05-06 ENCOUNTER — NON-APPOINTMENT (OUTPATIENT)
Age: 34
End: 2024-05-06

## 2024-05-06 RX ORDER — BLOOD-GLUCOSE METER
W/DEVICE KIT MISCELLANEOUS
Qty: 1 | Refills: 0 | Status: ACTIVE | COMMUNITY
Start: 2024-05-06 | End: 1900-01-01

## 2024-05-06 RX ORDER — BLOOD SUGAR DIAGNOSTIC
STRIP MISCELLANEOUS 4 TIMES DAILY
Qty: 1 | Refills: 6 | Status: ACTIVE | COMMUNITY
Start: 2024-05-06 | End: 1900-01-01

## 2024-05-06 RX ORDER — PEN NEEDLE, DIABETIC 31 GX5/16"
NEEDLE, DISPOSABLE MISCELLANEOUS
Qty: 120 | Refills: 5 | Status: ACTIVE | COMMUNITY
Start: 2024-05-06 | End: 1900-01-01

## 2024-05-07 ENCOUNTER — NON-APPOINTMENT (OUTPATIENT)
Age: 34
End: 2024-05-07

## 2024-05-07 ENCOUNTER — OUTPATIENT (OUTPATIENT)
Dept: OUTPATIENT SERVICES | Facility: HOSPITAL | Age: 34
LOS: 1 days | End: 2024-05-07
Payer: MEDICAID

## 2024-05-07 ENCOUNTER — APPOINTMENT (OUTPATIENT)
Dept: OBGYN | Facility: CLINIC | Age: 34
End: 2024-05-07
Payer: MEDICAID

## 2024-05-07 VITALS
TEMPERATURE: 97.2 F | DIASTOLIC BLOOD PRESSURE: 70 MMHG | HEIGHT: 62 IN | WEIGHT: 150 LBS | BODY MASS INDEX: 27.6 KG/M2 | SYSTOLIC BLOOD PRESSURE: 106 MMHG | HEART RATE: 94 BPM | OXYGEN SATURATION: 99 % | RESPIRATION RATE: 18 BRPM

## 2024-05-07 DIAGNOSIS — Z86.32 PERSONAL HISTORY OF GESTATIONAL DIABETES: ICD-10-CM

## 2024-05-07 DIAGNOSIS — Z71.85 ENCOUNTER FOR IMMUNIZATION SAFETY COUNSELING: ICD-10-CM

## 2024-05-07 DIAGNOSIS — Z23 ENCOUNTER FOR IMMUNIZATION: ICD-10-CM

## 2024-05-07 DIAGNOSIS — Z34.00 ENCOUNTER FOR SUPERVISION OF NORMAL FIRST PREGNANCY, UNSPECIFIED TRIMESTER: ICD-10-CM

## 2024-05-07 PROCEDURE — G0463: CPT

## 2024-05-07 PROCEDURE — 99214 OFFICE O/P EST MOD 30 MIN: CPT | Mod: TH

## 2024-05-07 PROCEDURE — 81003 URINALYSIS AUTO W/O SCOPE: CPT

## 2024-05-08 DIAGNOSIS — O24.410 GESTATIONAL DIABETES MELLITUS IN PREGNANCY, DIET CONTROLLED: ICD-10-CM

## 2024-05-08 DIAGNOSIS — E55.9 VITAMIN D DEFICIENCY, UNSPECIFIED: ICD-10-CM

## 2024-05-08 DIAGNOSIS — R21 RASH AND OTHER NONSPECIFIC SKIN ERUPTION: ICD-10-CM

## 2024-05-08 DIAGNOSIS — Z14.1 CYSTIC FIBROSIS CARRIER: ICD-10-CM

## 2024-05-08 DIAGNOSIS — Z3A.32 32 WEEKS GESTATION OF PREGNANCY: ICD-10-CM

## 2024-05-09 ENCOUNTER — APPOINTMENT (OUTPATIENT)
Dept: MATERNAL FETAL MEDICINE | Facility: CLINIC | Age: 34
End: 2024-05-09
Payer: MEDICAID

## 2024-05-09 ENCOUNTER — ASOB RESULT (OUTPATIENT)
Age: 34
End: 2024-05-09

## 2024-05-09 PROCEDURE — G0108 DIAB MANAGE TRN  PER INDIV: CPT | Mod: 95

## 2024-05-09 RX ORDER — URINE ACETONE TEST STRIPS
STRIP MISCELLANEOUS
Qty: 1 | Refills: 2 | Status: ACTIVE | COMMUNITY
Start: 2024-05-09 | End: 1900-01-01

## 2024-05-11 ENCOUNTER — EMERGENCY (EMERGENCY)
Facility: HOSPITAL | Age: 34
LOS: 1 days | Discharge: HOME | End: 2024-05-11
Admitting: EMERGENCY MEDICINE
Payer: MEDICAID

## 2024-05-11 VITALS
DIASTOLIC BLOOD PRESSURE: 53 MMHG | OXYGEN SATURATION: 99 % | SYSTOLIC BLOOD PRESSURE: 100 MMHG | TEMPERATURE: 98 F | HEART RATE: 87 BPM | RESPIRATION RATE: 15 BRPM

## 2024-05-11 PROCEDURE — 10061 I&D ABSCESS COMP/MULTIPLE: CPT

## 2024-05-11 PROCEDURE — 99284 EMERGENCY DEPT VISIT MOD MDM: CPT | Mod: 25

## 2024-05-12 VITALS
DIASTOLIC BLOOD PRESSURE: 63 MMHG | TEMPERATURE: 98 F | SYSTOLIC BLOOD PRESSURE: 102 MMHG | RESPIRATION RATE: 15 BRPM | OXYGEN SATURATION: 98 % | HEART RATE: 82 BPM

## 2024-05-12 LAB
GRAM STN FLD: ABNORMAL
SPECIMEN SOURCE: SIGNIFICANT CHANGE UP

## 2024-05-12 RX ORDER — ACETAMINOPHEN 500 MG
650 TABLET ORAL ONCE
Refills: 0 | Status: COMPLETED | OUTPATIENT
Start: 2024-05-12 | End: 2024-05-12

## 2024-05-12 RX ADMIN — Medication 650 MILLIGRAM(S): at 01:02

## 2024-05-12 NOTE — ED PROVIDER NOTE - CONSTITUTIONAL, MLM
Yes Well appearing, awake, alert, oriented to person, place, time/situation and in no apparent distress. normal...

## 2024-05-12 NOTE — ED PROVIDER NOTE - PHYSICAL EXAMINATION
Left lower leg: +furuncle with surrounding erythema, warmth, tenderness; no crepitus; 5/5 strength; sensation intact to light touch; soft compartments; 2+ pulses; < 2 sec cap refill

## 2024-05-12 NOTE — ED PROVIDER NOTE - OBJECTIVE STATEMENT
Patient is a 33-year-old female G2, P1 33 weeks pregnant presents with left lower leg redness since yesterday.  Patient reports developing itching at left lower leg for the past few days.  Patient reports developing redness and pain at the left lower leg prompting visit to the emergency department at this time.  Patient denies any fevers, chills, numbness, weakness, calf pain, abdominal pain, pelvic pain, nausea, vomiting, dysuria, cloudy urine, vaginal bleeding, vaginal discharge, leakage of fluid, decreased fetal movements, back pain.

## 2024-05-12 NOTE — ED PROVIDER NOTE - PROGRESS NOTE DETAILS
LANA De Los Santos: received pt in sign out pending NST  NST performed by OBGYN; no acute findings, cleared for d/c

## 2024-05-12 NOTE — ED POST DISCHARGE NOTE - RESULT SUMMARY
Lab called about Abscess CX; Patient discharged home with a prescription for Clindamycin. Wait for final results.

## 2024-05-12 NOTE — ED PROVIDER NOTE - CLINICAL SUMMARY MEDICAL DECISION MAKING FREE TEXT BOX
Patient is a 33-year-old female G2, P1 33 weeks pregnant presents with left lower leg redness since yesterday.  Patient reports developing itching at left lower leg for the past few days.  Patient reports developing redness and pain at the left lower leg prompting visit to the emergency department at this time.  Patient denies any fevers, chills, numbness, weakness, calf pain, abdominal pain, pelvic pain, nausea, vomiting, dysuria, cloudy urine, vaginal bleeding, vaginal discharge, leakage of fluid, decreased fetal movements, back pain.  This is a patient with a likely furuncle and surrounding cellulitis.  Plan to evacuate purulence, send culture and treat for cellulitis.  Plan to discharge patient with instructions to follow-up with primary physician and OB/GYN.

## 2024-05-12 NOTE — CHART NOTE - NSCHARTNOTEFT_GEN_A_CORE
34yo  @33w3d presents with LLE redness concerning for cellulitis. NST performed to confirm reassuring fetal status:     125/mod/+accel/-decel  no ctx on tocometer    - NST reactive and reassuring  - Continue remainder of care per primary team    d/w Dr. Km Torres PGY4

## 2024-05-12 NOTE — ED PROVIDER NOTE - PATIENT PORTAL LINK FT
You can access the FollowMyHealth Patient Portal offered by [facility] by registering at the following website: [email or web address]. By joining [employer]’s FollowMyHealth portal, you will also be able to view your health information using other applications (apps) compatible with our system.

## 2024-05-12 NOTE — ED PROVIDER NOTE - NSTIMEPROVIDERCAREINITIATE_GEN_ER
oumou 12-May-2024 00:03

## 2024-05-12 NOTE — ED PROVIDER NOTE - NSFOLLOWUPINSTRUCTIONS_ED_ALL_ED_FT
Advance activity as tolerated.  Continue all previously prescribed medications as directed unless otherwise instructed.  Take Clindamycin 150 mg three pills three times a day for 7 days.  Keep affected extremity/region elevated.  Follow up with your primary care physician and ob/gyn in 48-72 hours- bring copies of your results.  Return to the Emergency Department for worsening or persistent symptoms, including but not limited to worsening/persistent pain, redness, swelling, fevers, red streaks, passing out, numbness, weakness and/or ANY NEW OR CONCERNING SYMPTOMS. If you have issues obtaining follow up, please call: [phone] to obtain a doctor or specialist who takes your insurance in your area.  You may call [phone] to make an appointment with the internal medicine clinic.     CELLULITIS - General Information    Cellulitis    WHAT YOU NEED TO KNOW:    What is cellulitis? Cellulitis is a skin infection caused by bacteria. Cellulitis is common and can become severe. Cellulitis usually appears on the lower legs. It can also appear on the arms, face, and other areas. Cellulitis develops when bacteria enter a crack or break in your skin, such as a scratch, bite, or cut.  Cellulitis     What are the signs and symptoms of cellulitis? Signs and symptoms usually appear on one side of your body. You may have any of the following:    A fever    A red, warm, swollen area on your skin    Pain when the area is touched    Red spots, bumps, or blisters    Bumpy, raised skin that feels like an orange peel  How is cellulitis diagnosed? Your healthcare provider may know you have cellulitis by looking at your skin. You may need blood tests to show what kind of bacteria are causing your infection. Other tests may be needed to see how much the infection has spread.    How is cellulitis treated? You should start to see improvement in 3 days. If your cellulitis is severe, you may need IV antibiotics in the hospital. If cellulitis is not treated, the infection can spread through your body and become life-threatening. You may need any of the following medicines:    Antibiotics help treat a bacterial infection.    Acetaminophen decreases pain and fever. It is available without a doctor's order. Ask how much to take and how often to take it. Follow directions. Read the labels of all other medicines you are using to see if they also contain acetaminophen, or ask your doctor or pharmacist. Acetaminophen can cause liver damage if not taken correctly.    NSAIDs, such as ibuprofen, help decrease swelling, pain, and fever. This medicine is available with or without a doctor's order. NSAIDs can cause stomach bleeding or kidney problems in certain people. If you take blood thinner medicine, always ask your healthcare provider if NSAIDs are safe for you. Always read the medicine label and follow directions.  How can I manage my symptoms?    Wash the area with soap and water every day. Gently pat dry. Use bandages if directed by your healthcare provider.    Apply cream or ointment as directed. These help protect the area. Most over-the-counter products, such as petroleum jelly, are good to use. Ask your healthcare provider about specific creams or ointments you should use.    Place a cool, damp cloth on the area. Use clean cloths and clean water. You can do this as often as you need to. Cool, damp cloths may help decrease pain.    Elevate the area above the level of your heart as often as you can. This will help decrease swelling and pain. Prop the area on pillows or blankets to keep it elevated comfortably.  Elevate Leg  How can I help prevent cellulitis?    Do not scratch bug bites or areas of injury. You increase your risk for cellulitis by scratching these areas.    Do not share personal items, such as towels, clothing, and razors.    Clean exercise equipment with germ-killing  before and after you use it.    Treat athlete’s foot. This can help prevent the spread of a bacterial skin infection.    Wash your hands often. Use soap and water. Wash your hands after you use the bathroom, change a child's diapers, or sneeze. Wash your hands before you prepare or eat food. Use lotion to prevent dry, cracked skin.  Handwashing  When should I seek immediate care?    Your wound gets larger and more painful.    You feel a crackling under your skin when you touch it.    You have purple dots or bumps on your skin, or you see bleeding under your skin.    You see red streaks coming from the infected area.  When should I call my doctor?    The red, warm, swollen area gets larger.    Your fever or pain does not go away or gets worse.    The area does not get smaller after 3 days of antibiotics.    You have questions or concerns about your condition or care.  CARE AGREEMENT:    You have the right to help plan your care. Learn about your health condition and how it may be treated. Discuss treatment options with your healthcare providers to decide what care you want to receive. You always have the right to refuse treatment.    © [employer] 1973, 2023

## 2024-05-12 NOTE — ED ADULT NURSE NOTE - OBJECTIVE STATEMENT
received pt int5. a&ox4 RR even unlabored completing full sentences. pt is a G2, P1 33 weeks pregnant presents c/o left lower leg redness since yesterday.  pt states developed itching at left lower leg for the past few days.  Patient denies any fevers, chills, numbness, weakness, calf pain, abdominal pain, pelvic pain, nausea, vomiting, dysuria, cloudy urine, vaginal bleeding, vaginal discharge, leakage of fluid, decreased fetal movements, back pain. well appearing. medicated per EMAR.

## 2024-05-13 ENCOUNTER — NON-APPOINTMENT (OUTPATIENT)
Age: 34
End: 2024-05-13

## 2024-05-13 RX ORDER — PEN NEEDLE, DIABETIC 32GX 5/32"
32G X 4 MM NEEDLE, DISPOSABLE MISCELLANEOUS
Qty: 1 | Refills: 0 | Status: ACTIVE | COMMUNITY
Start: 2024-05-10 | End: 1900-01-01

## 2024-05-13 RX ORDER — ALCOHOL ANTISEPTIC PADS
PADS, MEDICATED (EA) TOPICAL
Qty: 1 | Refills: 0 | Status: ACTIVE | COMMUNITY
Start: 2024-05-10 | End: 1900-01-01

## 2024-05-13 RX ORDER — INSULIN HUMAN 100 [IU]/ML
100 INJECTION, SUSPENSION SUBCUTANEOUS
Qty: 1 | Refills: 0 | Status: ACTIVE | COMMUNITY
Start: 2024-05-10 | End: 1900-01-01

## 2024-05-15 ENCOUNTER — APPOINTMENT (OUTPATIENT)
Dept: MATERNAL FETAL MEDICINE | Facility: CLINIC | Age: 34
End: 2024-05-15
Payer: MEDICAID

## 2024-05-15 ENCOUNTER — ASOB RESULT (OUTPATIENT)
Age: 34
End: 2024-05-15

## 2024-05-15 PROCEDURE — G0108 DIAB MANAGE TRN  PER INDIV: CPT | Mod: 95

## 2024-05-16 ENCOUNTER — APPOINTMENT (OUTPATIENT)
Dept: ANTEPARTUM | Facility: CLINIC | Age: 34
End: 2024-05-16
Payer: MEDICAID

## 2024-05-16 ENCOUNTER — ASOB RESULT (OUTPATIENT)
Age: 34
End: 2024-05-16

## 2024-05-16 PROCEDURE — 76818 FETAL BIOPHYS PROFILE W/NST: CPT | Mod: 59

## 2024-05-16 PROCEDURE — 76816 OB US FOLLOW-UP PER FETUS: CPT

## 2024-05-16 PROCEDURE — 76820 UMBILICAL ARTERY ECHO: CPT | Mod: 59

## 2024-05-16 RX ORDER — INSULIN LISPRO 100 [IU]/ML
100 INJECTION, SOLUTION INTRAVENOUS; SUBCUTANEOUS
Qty: 1 | Refills: 0 | Status: ACTIVE | COMMUNITY
Start: 2024-05-15 | End: 1900-01-01

## 2024-05-17 LAB
CULTURE RESULTS: ABNORMAL
SPECIMEN SOURCE: SIGNIFICANT CHANGE UP

## 2024-05-21 ENCOUNTER — APPOINTMENT (OUTPATIENT)
Dept: MATERNAL FETAL MEDICINE | Facility: CLINIC | Age: 34
End: 2024-05-21
Payer: MEDICAID

## 2024-05-21 ENCOUNTER — ASOB RESULT (OUTPATIENT)
Age: 34
End: 2024-05-21

## 2024-05-21 PROCEDURE — G0108 DIAB MANAGE TRN  PER INDIV: CPT | Mod: 95

## 2024-05-23 ENCOUNTER — APPOINTMENT (OUTPATIENT)
Dept: ANTEPARTUM | Facility: CLINIC | Age: 34
End: 2024-05-23
Payer: MEDICAID

## 2024-05-23 ENCOUNTER — ASOB RESULT (OUTPATIENT)
Age: 34
End: 2024-05-23

## 2024-05-23 ENCOUNTER — APPOINTMENT (OUTPATIENT)
Dept: INTERNAL MEDICINE | Facility: CLINIC | Age: 34
End: 2024-05-23

## 2024-05-23 PROCEDURE — 76820 UMBILICAL ARTERY ECHO: CPT

## 2024-05-23 PROCEDURE — 76818 FETAL BIOPHYS PROFILE W/NST: CPT

## 2024-05-23 RX ORDER — CHLORHEXIDINE GLUCONATE 4 %
325 (65 FE) LIQUID (ML) TOPICAL DAILY
Qty: 30 | Refills: 0 | Status: ACTIVE | COMMUNITY
Start: 2024-05-23 | End: 1900-01-01

## 2024-05-23 RX ORDER — LOPERAMIDE HYDROCHLORIDE 2 MG/1
2 CAPSULE ORAL TWICE DAILY
Qty: 12 | Refills: 0 | Status: ACTIVE | COMMUNITY
Start: 2024-05-23 | End: 1900-01-01

## 2024-05-26 ENCOUNTER — ASOB RESULT (OUTPATIENT)
Age: 34
End: 2024-05-26

## 2024-05-26 ENCOUNTER — OUTPATIENT (OUTPATIENT)
Dept: INPATIENT UNIT | Facility: HOSPITAL | Age: 34
LOS: 1 days | Discharge: HOME | End: 2024-05-26
Payer: MEDICAID

## 2024-05-26 ENCOUNTER — APPOINTMENT (OUTPATIENT)
Dept: ANTEPARTUM | Facility: CLINIC | Age: 34
End: 2024-05-26
Payer: COMMERCIAL

## 2024-05-26 VITALS
DIASTOLIC BLOOD PRESSURE: 50 MMHG | TEMPERATURE: 98 F | RESPIRATION RATE: 16 BRPM | HEART RATE: 72 BPM | SYSTOLIC BLOOD PRESSURE: 81 MMHG

## 2024-05-26 VITALS — DIASTOLIC BLOOD PRESSURE: 55 MMHG | HEART RATE: 60 BPM | SYSTOLIC BLOOD PRESSURE: 94 MMHG

## 2024-05-26 DIAGNOSIS — O26.899 OTHER SPECIFIED PREGNANCY RELATED CONDITIONS, UNSPECIFIED TRIMESTER: ICD-10-CM

## 2024-05-26 LAB
ALBUMIN SERPL ELPH-MCNC: 3.2 G/DL — LOW (ref 3.3–5)
ALP SERPL-CCNC: 98 U/L — SIGNIFICANT CHANGE UP (ref 40–120)
ALT FLD-CCNC: 16 U/L — SIGNIFICANT CHANGE UP (ref 4–33)
ANION GAP SERPL CALC-SCNC: 13 MMOL/L — SIGNIFICANT CHANGE UP (ref 7–14)
APPEARANCE UR: ABNORMAL
AST SERPL-CCNC: 17 U/L — SIGNIFICANT CHANGE UP (ref 4–32)
BACTERIA # UR AUTO: NEGATIVE /HPF — SIGNIFICANT CHANGE UP
BILIRUB SERPL-MCNC: 0.3 MG/DL — SIGNIFICANT CHANGE UP (ref 0.2–1.2)
BILIRUB UR-MCNC: NEGATIVE — SIGNIFICANT CHANGE UP
BUN SERPL-MCNC: 15 MG/DL — SIGNIFICANT CHANGE UP (ref 7–23)
CALCIUM SERPL-MCNC: 8.5 MG/DL — SIGNIFICANT CHANGE UP (ref 8.4–10.5)
CAST: 0 /LPF — SIGNIFICANT CHANGE UP (ref 0–4)
CHLORIDE SERPL-SCNC: 106 MMOL/L — SIGNIFICANT CHANGE UP (ref 98–107)
CO2 SERPL-SCNC: 18 MMOL/L — LOW (ref 22–31)
COD CRY URNS QL: PRESENT
COLOR SPEC: YELLOW — SIGNIFICANT CHANGE UP
CREAT SERPL-MCNC: 0.5 MG/DL — SIGNIFICANT CHANGE UP (ref 0.5–1.3)
DIFF PNL FLD: NEGATIVE — SIGNIFICANT CHANGE UP
EGFR: 129 ML/MIN/1.73M2 — SIGNIFICANT CHANGE UP
GLUCOSE SERPL-MCNC: 74 MG/DL — SIGNIFICANT CHANGE UP (ref 70–99)
GLUCOSE UR QL: NEGATIVE MG/DL — SIGNIFICANT CHANGE UP
HCT VFR BLD CALC: 32.6 % — LOW (ref 34.5–45)
HGB BLD-MCNC: 11.2 G/DL — LOW (ref 11.5–15.5)
KETONES UR-MCNC: NEGATIVE MG/DL — SIGNIFICANT CHANGE UP
LEUKOCYTE ESTERASE UR-ACNC: ABNORMAL
MCHC RBC-ENTMCNC: 31 PG — SIGNIFICANT CHANGE UP (ref 27–34)
MCHC RBC-ENTMCNC: 34.4 GM/DL — SIGNIFICANT CHANGE UP (ref 32–36)
MCV RBC AUTO: 90.3 FL — SIGNIFICANT CHANGE UP (ref 80–100)
NITRITE UR-MCNC: NEGATIVE — SIGNIFICANT CHANGE UP
NRBC # BLD: 0 /100 WBCS — SIGNIFICANT CHANGE UP (ref 0–0)
NRBC # FLD: 0 K/UL — SIGNIFICANT CHANGE UP (ref 0–0)
PH UR: 6.5 — SIGNIFICANT CHANGE UP (ref 5–8)
PLATELET # BLD AUTO: 187 K/UL — SIGNIFICANT CHANGE UP (ref 150–400)
POTASSIUM SERPL-MCNC: 3.7 MMOL/L — SIGNIFICANT CHANGE UP (ref 3.5–5.3)
POTASSIUM SERPL-SCNC: 3.7 MMOL/L — SIGNIFICANT CHANGE UP (ref 3.5–5.3)
PROT SERPL-MCNC: 6 G/DL — SIGNIFICANT CHANGE UP (ref 6–8.3)
PROT UR-MCNC: SIGNIFICANT CHANGE UP MG/DL
RBC # BLD: 3.61 M/UL — LOW (ref 3.8–5.2)
RBC # FLD: 14.6 % — HIGH (ref 10.3–14.5)
RBC CASTS # UR COMP ASSIST: 7 /HPF — HIGH (ref 0–4)
REVIEW: SIGNIFICANT CHANGE UP
SODIUM SERPL-SCNC: 137 MMOL/L — SIGNIFICANT CHANGE UP (ref 135–145)
SP GR SPEC: 1.03 — SIGNIFICANT CHANGE UP (ref 1–1.03)
SQUAMOUS # UR AUTO: 11 /HPF — HIGH (ref 0–5)
UROBILINOGEN FLD QL: 1 MG/DL — SIGNIFICANT CHANGE UP (ref 0.2–1)
WBC # BLD: 7.38 K/UL — SIGNIFICANT CHANGE UP (ref 3.8–10.5)
WBC # FLD AUTO: 7.38 K/UL — SIGNIFICANT CHANGE UP (ref 3.8–10.5)
WBC UR QL: 5 /HPF — SIGNIFICANT CHANGE UP (ref 0–5)

## 2024-05-26 PROCEDURE — 99221 1ST HOSP IP/OBS SF/LOW 40: CPT | Mod: 25

## 2024-05-26 PROCEDURE — 59025 FETAL NON-STRESS TEST: CPT | Mod: 26

## 2024-05-26 PROCEDURE — 76819 FETAL BIOPHYS PROFIL W/O NST: CPT | Mod: 26

## 2024-05-26 RX ORDER — SODIUM CHLORIDE 9 MG/ML
1000 INJECTION, SOLUTION INTRAVENOUS ONCE
Refills: 0 | Status: COMPLETED | OUTPATIENT
Start: 2024-05-26 | End: 2024-05-26

## 2024-05-26 RX ADMIN — SODIUM CHLORIDE 2000 MILLILITER(S): 9 INJECTION, SOLUTION INTRAVENOUS at 14:30

## 2024-05-26 NOTE — OB PROVIDER TRIAGE NOTE - NS_OBGYNHISTORY_OBGYN_ALL_OB_FT
OB hx: 10/5/2019, full term, , 2500 g, GDMA2 in [place]  GYN hx: denies hx of abnormal Papsmear/cysts/fibroids/STDs

## 2024-05-26 NOTE — OB PROVIDER TRIAGE NOTE - HISTORY OF PRESENT ILLNESS
32 yo , EGA@35.3 weeks presented c/o watery stool (once a day since 5/15/2024).    Patient denies fever, chills, sick contact; denies nausea/vomitting.  Patient reports mild contractions, denies leaking fluid, vaginal bleeding, reports positive fetal movements.  Prenatal care with Dr. Kinney ([place])  Prenatal course is significant for:  - GDM, A2 on insulin (short acting with meals 4 Units, long lasting 8 Units at night. Patient does not know the name of the insuline.  - patient was evaluated at ER on 2024 for left lower extremity cellulitis, was discharged home with Rx for Clindamycin    34 yo , EGA@35.3 weeks presented c/o watery stool (once a day since 5/15/2024).    Patient denies fever, chills, sick contact; denies nausea/vomitting.  Patient reports mild contractions, denies leaking fluid, vaginal bleeding, reports positive fetal movements.  Prenatal care with Dr. Kinney ([place])  Prenatal course is significant for:  - GDM, A2 on Lispro with meals 4 Units, Humalin 8 Units at night.   - patient was evaluated at ER on 2024 for left lower extremity cellulitis, was discharged home with Rx for Clindamycin   - MFM scan on 2024: EFW 1860 g (4th%tile, AC<1st %tile), BPP 10/10, Dopplers wnl

## 2024-05-26 NOTE — OB PROVIDER TRIAGE NOTE - NSHPPHYSICALEXAM_GEN_ALL_CORE
T(C): 36.7 (05-26-24 @ 13:46), Max: 36.7 (05-26-24 @ 13:12)  HR: 67 (05-26-24 @ 14:09) (67 - 77)  BP: 96/54 (05-26-24 @ 14:09) (81/50 - 103/59)  RR: 16 (05-26-24 @ 13:46) (16 - 16)    A&Ox3  Heart: RRR, S1&S2, no S3  Lungs: Clear bilateral to auscultation, good inspiratory /expiratory effort              no rhonchi, no rales  Abd: soft, Gravid, TOCO in place            Bedside Targeted Limited Transabdominal Sonogram:  Ext:  FROM /bilateral  1+ LE edema, healed scar left ankle   Neuro: grossly intact T(C): 36.7 (05-26-24 @ 13:46), Max: 36.7 (05-26-24 @ 13:12)  HR: 67 (05-26-24 @ 14:09) (67 - 77)  BP: 96/54 (05-26-24 @ 14:09) (81/50 - 103/59)  RR: 16 (05-26-24 @ 13:46) (16 - 16)    A&Ox3  Heart: RRR, S1&S2, no S3  Lungs: Clear bilateral to auscultation, good inspiratory /expiratory effort              no rhonchi, no rales  Abd: soft, Gravid, TOCO in place            Bedside Targeted Limited Transabdominal Sonogram: vertex, anterior placenta, RUSSELL 13.3, BPP 8/8, EFW 2004 g; images and report are saved into ASOB  Ext:  FROM /bilateral  1+ LE edema, healed scar left ankle   Neuro: grossly intact T(C): 36.7 (05-26-24 @ 13:46), Max: 36.7 (05-26-24 @ 13:12)  HR: 67 (05-26-24 @ 14:09) (67 - 77)  BP: 96/54 (05-26-24 @ 14:09) (81/50 - 103/59)  RR: 16 (05-26-24 @ 13:46) (16 - 16)    A&Ox3  Heart: RRR, S1&S2, no S3  Lungs: Clear bilateral to auscultation, good inspiratory /expiratory effort              no rhonchi, no rales  Abd: soft, Gravid, TOCO in place            Bedside Targeted Limited Transabdominal Sonogram: vertex, anterior placenta, RUSSELL 13.3, BPP 8/8, EFW 2004 g; images and report are saved into ASOB  Ext:  FROM /bilateral  1+ LE edema, healed scar left ankle   Neuro: grossly intact    fingerstick 71

## 2024-05-26 NOTE — OB PROVIDER TRIAGE NOTE - ADDITIONAL INSTRUCTIONS
follow up with Dr Kinney within 3 days, continue NST/BPP twice a week, follow up on IUGR; probiotics intake was encouraged;  labor precautions and fetal movements count reviewed; continue fingerstick monitoring.

## 2024-05-26 NOTE — OB PROVIDER TRIAGE NOTE - NS_DCGESTAGE_OBGYN_ALL_OB_FT
Group Therapy Documentation    PATIENT'S NAME: Flower Logan  MRN:   [id number]  :   1990  ACCT. NUMBER: [id number]  DATE OF SERVICE: 20  START TIME:  9:00 AM  END TIME: 11:00 AM  FACILITATOR(S): Ebony Sarmiento LADC; Brianne Shirley CD Intern  TOPIC: BEH Group Therapy  Number of patients attending the group:  5  Group Length:  2 Hours    Group Therapy Type: Emotion processing    Summary of Group / Topics Discussed:    Sober coping skills, Mindfulness/Relaxation, and Emotions/expression      Group Attendance:  Attended group session    Patient's response to the group topic/interactions:  discussed personal experience with topic, expressed readiness to alter behaviors and listened actively    Patient appeared to be Actively participating, Attentive and Engaged.      Client specific details: During check-in pt reported feeling irritable and distracted due to being tired. She shared that she believes she does her best every day with what she has, but that there are things that interfere with her ability to be present. She shared a good-bye letter as part of her grief and loss assignment and received supportive feedback from her peers. In the letter, pt expressed remorse for how her past behavior affected a former significant other.   35

## 2024-05-26 NOTE — OB PROVIDER TRIAGE NOTE - NSOBPROVIDERNOTE_OBGYN_ALL_OB_FT
A: 34 yo , EGA@35.3 weeks, GD, A2 well controlled; watery stools after Clindamycin  Plan: case was reviewed with PGY 4 Dr Tate,   - CBC, CMP, urinalysis, IVF hydration  - will reevaluate after labs are resulted     Shaina Booker CNM A: 32 yo , EGA@35.3 weeks, GD, A2 well controlled; watery stools after Clindamycin course; IUGR  Plan: case was reviewed with PGY 4 Dr Tate,   - CBC, CMP, urinalysis, IVF hydration  - will reevaluate after labs are resulted     Shaina Booker CNM A: 34 yo , EGA@35.3 weeks, GD, A2 well controlled; watery stools after Clindamycin course; IUGR  Plan: case was reviewed with PGY 4 Dr Tate,   - CBC, CMP, urinalysis, IVF hydration  - will reevaluate after labs are resulted     Shaina Booker CNM    addendum at 15:45:                        11.2   7.38  )-----------( 187      ( 26 May 2024 14:10 )             32.6       137  |  106  |  15  ----------------------------<  74  3.7   |  18<L>  |  0.50    Ca    8.5      26 May 2024 14:10    TPro  6.0  /  Alb  3.2<L>  /  TBili  0.3  /  DBili  x   /  AST  17  /  ALT  16  /  AlkPhos  98    Urinalysis Basic - ( 26 May 2024 14:10 )    Color: Yellow / Appearance: Cloudy / S.026 / pH: x  Gluc: 74 mg/dL / Ketone: Negative mg/dL  / Bili: Negative / Urobili: 1.0 mg/dL   Blood: x / Protein: Trace mg/dL / Nitrite: Negative   Leuk Esterase: Trace / RBC: 7 /HPF / WBC 5 /HPF   Sq Epi: x / Non Sq Epi: 11 /HPF / Bacteria: Negative /HPF  FS 71    case was reviewed with PGY 4 Dr Tate, Dr Ferguson, service attending, was advised to discharge patient home with instructions to follow up with Dr Kinney within 3 days, continue NST/BPP twice a week, follow up on IUGR; probiotics intake was encouraged;  labor precautions and fetal movements count reviewed; continue fingerstick monitoring.

## 2024-05-28 ENCOUNTER — NON-APPOINTMENT (OUTPATIENT)
Age: 34
End: 2024-05-28

## 2024-05-28 ENCOUNTER — APPOINTMENT (OUTPATIENT)
Dept: OBGYN | Facility: CLINIC | Age: 34
End: 2024-05-28
Payer: MEDICAID

## 2024-05-28 ENCOUNTER — OUTPATIENT (OUTPATIENT)
Dept: OUTPATIENT SERVICES | Facility: HOSPITAL | Age: 34
LOS: 1 days | End: 2024-05-28
Payer: MEDICAID

## 2024-05-28 VITALS
RESPIRATION RATE: 16 BRPM | TEMPERATURE: 97.4 F | OXYGEN SATURATION: 98 % | HEART RATE: 79 BPM | BODY MASS INDEX: 27.79 KG/M2 | SYSTOLIC BLOOD PRESSURE: 88 MMHG | DIASTOLIC BLOOD PRESSURE: 55 MMHG | WEIGHT: 151 LBS | HEIGHT: 62 IN

## 2024-05-28 DIAGNOSIS — R19.7 DIARRHEA, UNSPECIFIED: ICD-10-CM

## 2024-05-28 DIAGNOSIS — O36.5930 MATERNAL CARE FOR OTHER KNOWN OR SUSPECTED POOR FETAL GROWTH, THIRD TRIMESTER, NOT APPLICABLE OR UNSPECIFIED: ICD-10-CM

## 2024-05-28 DIAGNOSIS — O24.410 GESTATIONAL DIABETES MELLITUS IN PREGNANCY, DIET CONTROLLED: ICD-10-CM

## 2024-05-28 DIAGNOSIS — O24.414 GESTATIONAL DIABETES MELLITUS IN PREGNANCY, INSULIN CONTROLLED: ICD-10-CM

## 2024-05-28 DIAGNOSIS — Z34.00 ENCOUNTER FOR SUPERVISION OF NORMAL FIRST PREGNANCY, UNSPECIFIED TRIMESTER: ICD-10-CM

## 2024-05-28 DIAGNOSIS — Z3A.35 35 WEEKS GESTATION OF PREGNANCY: ICD-10-CM

## 2024-05-28 DIAGNOSIS — O47.03 FALSE LABOR BEFORE 37 COMPLETED WEEKS OF GESTATION, THIRD TRIMESTER: ICD-10-CM

## 2024-05-28 DIAGNOSIS — O26.893 OTHER SPECIFIED PREGNANCY RELATED CONDITIONS, THIRD TRIMESTER: ICD-10-CM

## 2024-05-28 PROBLEM — Z78.9 OTHER SPECIFIED HEALTH STATUS: Chronic | Status: ACTIVE | Noted: 2024-05-26

## 2024-05-28 PROCEDURE — 81003 URINALYSIS AUTO W/O SCOPE: CPT

## 2024-05-28 PROCEDURE — G0463: CPT

## 2024-05-28 PROCEDURE — 99214 OFFICE O/P EST MOD 30 MIN: CPT | Mod: TH

## 2024-05-29 DIAGNOSIS — R21 RASH AND OTHER NONSPECIFIC SKIN ERUPTION: ICD-10-CM

## 2024-05-29 DIAGNOSIS — E55.9 VITAMIN D DEFICIENCY, UNSPECIFIED: ICD-10-CM

## 2024-05-29 DIAGNOSIS — O36.5930 MATERNAL CARE FOR OTHER KNOWN OR SUSPECTED POOR FETAL GROWTH, THIRD TRIMESTER, NOT APPLICABLE OR UNSPECIFIED: ICD-10-CM

## 2024-05-29 DIAGNOSIS — Z14.1 CYSTIC FIBROSIS CARRIER: ICD-10-CM

## 2024-05-29 DIAGNOSIS — O99.019 ANEMIA COMPLICATING PREGNANCY, UNSPECIFIED TRIMESTER: ICD-10-CM

## 2024-05-29 DIAGNOSIS — O24.414 GESTATIONAL DIABETES MELLITUS IN PREGNANCY, INSULIN CONTROLLED: ICD-10-CM

## 2024-05-29 DIAGNOSIS — Z34.93 ENCOUNTER FOR SUPERVISION OF NORMAL PREGNANCY, UNSPECIFIED, THIRD TRIMESTER: ICD-10-CM

## 2024-05-30 ENCOUNTER — APPOINTMENT (OUTPATIENT)
Dept: ANTEPARTUM | Facility: CLINIC | Age: 34
End: 2024-05-30

## 2024-05-31 ENCOUNTER — APPOINTMENT (OUTPATIENT)
Dept: MATERNAL FETAL MEDICINE | Facility: CLINIC | Age: 34
End: 2024-05-31
Payer: MEDICAID

## 2024-05-31 ENCOUNTER — ASOB RESULT (OUTPATIENT)
Age: 34
End: 2024-05-31

## 2024-05-31 PROCEDURE — G0108 DIAB MANAGE TRN  PER INDIV: CPT | Mod: 95

## 2024-06-03 ENCOUNTER — NON-APPOINTMENT (OUTPATIENT)
Age: 34
End: 2024-06-03

## 2024-06-04 ENCOUNTER — ASOB RESULT (OUTPATIENT)
Age: 34
End: 2024-06-04

## 2024-06-04 ENCOUNTER — APPOINTMENT (OUTPATIENT)
Dept: ANTEPARTUM | Facility: CLINIC | Age: 34
End: 2024-06-04
Payer: MEDICAID

## 2024-06-04 ENCOUNTER — NON-APPOINTMENT (OUTPATIENT)
Age: 34
End: 2024-06-04

## 2024-06-04 ENCOUNTER — OUTPATIENT (OUTPATIENT)
Dept: OUTPATIENT SERVICES | Facility: HOSPITAL | Age: 34
LOS: 1 days | End: 2024-06-04
Payer: MEDICAID

## 2024-06-04 ENCOUNTER — APPOINTMENT (OUTPATIENT)
Dept: OBGYN | Facility: CLINIC | Age: 34
End: 2024-06-04
Payer: MEDICAID

## 2024-06-04 VITALS
BODY MASS INDEX: 28.32 KG/M2 | SYSTOLIC BLOOD PRESSURE: 102 MMHG | DIASTOLIC BLOOD PRESSURE: 65 MMHG | OXYGEN SATURATION: 98 % | WEIGHT: 150 LBS | HEART RATE: 65 BPM | TEMPERATURE: 97.2 F | HEIGHT: 61 IN | RESPIRATION RATE: 18 BRPM

## 2024-06-04 DIAGNOSIS — O24.414 GESTATIONAL DIABETES MELLITUS IN PREGNANCY, INSULIN CONTROLLED: ICD-10-CM

## 2024-06-04 DIAGNOSIS — O99.019 ANEMIA COMPLICATING PREGNANCY, UNSPECIFIED TRIMESTER: ICD-10-CM

## 2024-06-04 DIAGNOSIS — O36.5930 MATERNAL CARE FOR OTHER KNOWN OR SUSPECTED POOR FETAL GROWTH, THIRD TRIMESTER, NOT APPLICABLE OR UNSPECIFIED: ICD-10-CM

## 2024-06-04 DIAGNOSIS — R21 RASH AND OTHER NONSPECIFIC SKIN ERUPTION: ICD-10-CM

## 2024-06-04 DIAGNOSIS — E55.9 VITAMIN D DEFICIENCY, UNSPECIFIED: ICD-10-CM

## 2024-06-04 DIAGNOSIS — Z34.93 ENCOUNTER FOR SUPERVISION OF NORMAL PREGNANCY, UNSPECIFIED, THIRD TRIMESTER: ICD-10-CM

## 2024-06-04 DIAGNOSIS — Z14.1 CYSTIC FIBROSIS CARRIER: ICD-10-CM

## 2024-06-04 DIAGNOSIS — Z34.00 ENCOUNTER FOR SUPERVISION OF NORMAL FIRST PREGNANCY, UNSPECIFIED TRIMESTER: ICD-10-CM

## 2024-06-04 PROCEDURE — 85025 COMPLETE CBC W/AUTO DIFF WBC: CPT

## 2024-06-04 PROCEDURE — 84156 ASSAY OF PROTEIN URINE: CPT

## 2024-06-04 PROCEDURE — 87591 N.GONORRHOEAE DNA AMP PROB: CPT

## 2024-06-04 PROCEDURE — 82239 BILE ACIDS TOTAL: CPT

## 2024-06-04 PROCEDURE — 99214 OFFICE O/P EST MOD 30 MIN: CPT | Mod: TH,25

## 2024-06-04 PROCEDURE — 76818 FETAL BIOPHYS PROFILE W/NST: CPT

## 2024-06-04 PROCEDURE — 87653 STREP B DNA AMP PROBE: CPT

## 2024-06-04 PROCEDURE — 80053 COMPREHEN METABOLIC PANEL: CPT

## 2024-06-04 PROCEDURE — 87491 CHLMYD TRACH DNA AMP PROBE: CPT

## 2024-06-04 PROCEDURE — 87389 HIV-1 AG W/HIV-1&-2 AB AG IA: CPT

## 2024-06-04 PROCEDURE — 76820 UMBILICAL ARTERY ECHO: CPT

## 2024-06-04 PROCEDURE — G0463: CPT

## 2024-06-04 PROCEDURE — 86780 TREPONEMA PALLIDUM: CPT

## 2024-06-04 PROCEDURE — 84550 ASSAY OF BLOOD/URIC ACID: CPT

## 2024-06-04 PROCEDURE — 36415 COLL VENOUS BLD VENIPUNCTURE: CPT

## 2024-06-04 PROCEDURE — 81003 URINALYSIS AUTO W/O SCOPE: CPT

## 2024-06-04 PROCEDURE — 82570 ASSAY OF URINE CREATININE: CPT

## 2024-06-05 ENCOUNTER — APPOINTMENT (OUTPATIENT)
Dept: INTERNAL MEDICINE | Facility: CLINIC | Age: 34
End: 2024-06-05

## 2024-06-05 DIAGNOSIS — R21 RASH AND OTHER NONSPECIFIC SKIN ERUPTION: ICD-10-CM

## 2024-06-05 DIAGNOSIS — E55.9 VITAMIN D DEFICIENCY, UNSPECIFIED: ICD-10-CM

## 2024-06-05 DIAGNOSIS — O24.414 GESTATIONAL DIABETES MELLITUS IN PREGNANCY, INSULIN CONTROLLED: ICD-10-CM

## 2024-06-05 DIAGNOSIS — O99.019 ANEMIA COMPLICATING PREGNANCY, UNSPECIFIED TRIMESTER: ICD-10-CM

## 2024-06-05 DIAGNOSIS — Z14.1 CYSTIC FIBROSIS CARRIER: ICD-10-CM

## 2024-06-05 DIAGNOSIS — Z34.93 ENCOUNTER FOR SUPERVISION OF NORMAL PREGNANCY, UNSPECIFIED, THIRD TRIMESTER: ICD-10-CM

## 2024-06-05 DIAGNOSIS — O36.5930 MATERNAL CARE FOR OTHER KNOWN OR SUSPECTED POOR FETAL GROWTH, THIRD TRIMESTER, NOT APPLICABLE OR UNSPECIFIED: ICD-10-CM

## 2024-06-05 LAB
ALBUMIN SERPL ELPH-MCNC: 3.7 G/DL
ALP BLD-CCNC: 112 U/L
ALT SERPL-CCNC: 14 U/L
ANION GAP SERPL CALC-SCNC: 13 MMOL/L
AST SERPL-CCNC: 14 U/L
BASOPHILS # BLD AUTO: 0.03 K/UL
BASOPHILS NFR BLD AUTO: 0.4 %
BILIRUB SERPL-MCNC: 0.2 MG/DL
BUN SERPL-MCNC: 17 MG/DL
C TRACH RRNA SPEC QL NAA+PROBE: NOT DETECTED
CALCIUM SERPL-MCNC: 8.8 MG/DL
CHLORIDE SERPL-SCNC: 106 MMOL/L
CO2 SERPL-SCNC: 20 MMOL/L
CREAT SERPL-MCNC: 0.66 MG/DL
CREAT SPEC-SCNC: 80 MG/DL
CREAT/PROT UR: 0.2 RATIO
EGFR: 120 ML/MIN/1.73M2
EOSINOPHIL # BLD AUTO: 0.05 K/UL
EOSINOPHIL NFR BLD AUTO: 0.6 %
GLUCOSE SERPL-MCNC: 50 MG/DL
HCT VFR BLD CALC: 37.1 %
HGB BLD-MCNC: 12 G/DL
HIV1+2 AB SPEC QL IA.RAPID: NONREACTIVE
IMM GRANULOCYTES NFR BLD AUTO: 0.3 %
LYMPHOCYTES # BLD AUTO: 1.9 K/UL
LYMPHOCYTES NFR BLD AUTO: 24.5 %
MAN DIFF?: NORMAL
MCHC RBC-ENTMCNC: 30.8 PG
MCHC RBC-ENTMCNC: 32.3 GM/DL
MCV RBC AUTO: 95.4 FL
MONOCYTES # BLD AUTO: 0.51 K/UL
MONOCYTES NFR BLD AUTO: 6.6 %
N GONORRHOEA RRNA SPEC QL NAA+PROBE: NOT DETECTED
NEUTROPHILS # BLD AUTO: 5.23 K/UL
NEUTROPHILS NFR BLD AUTO: 67.6 %
PLATELET # BLD AUTO: 176 K/UL
POTASSIUM SERPL-SCNC: 4.6 MMOL/L
PROT SERPL-MCNC: 6.5 G/DL
PROT UR-MCNC: 12 MG/DL
RBC # BLD: 3.89 M/UL
RBC # FLD: 15.5 %
SODIUM SERPL-SCNC: 139 MMOL/L
SOURCE AMPLIFICATION: NORMAL
T PALLIDUM AB SER QL IA: NEGATIVE
URATE SERPL-MCNC: 4.5 MG/DL
WBC # FLD AUTO: 7.74 K/UL

## 2024-06-06 ENCOUNTER — APPOINTMENT (OUTPATIENT)
Dept: ANTEPARTUM | Facility: CLINIC | Age: 34
End: 2024-06-06

## 2024-06-06 LAB
GP B STREP DNA SPEC QL NAA+PROBE: NOT DETECTED
SOURCE GBS: NORMAL

## 2024-06-07 ENCOUNTER — APPOINTMENT (OUTPATIENT)
Dept: OBGYN | Facility: CLINIC | Age: 34
End: 2024-06-07
Payer: MEDICAID

## 2024-06-07 ENCOUNTER — NON-APPOINTMENT (OUTPATIENT)
Age: 34
End: 2024-06-07

## 2024-06-07 VITALS
HEIGHT: 61 IN | HEART RATE: 85 BPM | WEIGHT: 151 LBS | DIASTOLIC BLOOD PRESSURE: 62 MMHG | SYSTOLIC BLOOD PRESSURE: 101 MMHG | BODY MASS INDEX: 28.51 KG/M2

## 2024-06-07 LAB — BILE AC SER-MCNC: 3.3 UMOL/L

## 2024-06-07 PROCEDURE — 99213 OFFICE O/P EST LOW 20 MIN: CPT | Mod: TH,25

## 2024-06-10 ENCOUNTER — APPOINTMENT (OUTPATIENT)
Dept: ANTEPARTUM | Facility: CLINIC | Age: 34
End: 2024-06-10
Payer: MEDICAID

## 2024-06-10 ENCOUNTER — APPOINTMENT (OUTPATIENT)
Dept: OBGYN | Facility: CLINIC | Age: 34
End: 2024-06-10

## 2024-06-10 ENCOUNTER — ASOB RESULT (OUTPATIENT)
Age: 34
End: 2024-06-10

## 2024-06-10 PROCEDURE — 76820 UMBILICAL ARTERY ECHO: CPT | Mod: 59

## 2024-06-10 PROCEDURE — 76818 FETAL BIOPHYS PROFILE W/NST: CPT | Mod: 59

## 2024-06-10 PROCEDURE — 76816 OB US FOLLOW-UP PER FETUS: CPT

## 2024-06-11 ENCOUNTER — APPOINTMENT (OUTPATIENT)
Dept: MATERNAL FETAL MEDICINE | Facility: CLINIC | Age: 34
End: 2024-06-11

## 2024-06-12 ENCOUNTER — APPOINTMENT (OUTPATIENT)
Dept: OBGYN | Facility: CLINIC | Age: 34
End: 2024-06-12
Payer: MEDICAID

## 2024-06-12 ENCOUNTER — NON-APPOINTMENT (OUTPATIENT)
Age: 34
End: 2024-06-12

## 2024-06-12 ENCOUNTER — ASOB RESULT (OUTPATIENT)
Age: 34
End: 2024-06-12

## 2024-06-12 ENCOUNTER — APPOINTMENT (OUTPATIENT)
Dept: MATERNAL FETAL MEDICINE | Facility: CLINIC | Age: 34
End: 2024-06-12
Payer: MEDICAID

## 2024-06-12 VITALS
HEIGHT: 61 IN | SYSTOLIC BLOOD PRESSURE: 101 MMHG | DIASTOLIC BLOOD PRESSURE: 69 MMHG | HEART RATE: 81 BPM | BODY MASS INDEX: 28.51 KG/M2 | WEIGHT: 151 LBS

## 2024-06-12 PROCEDURE — G0108 DIAB MANAGE TRN  PER INDIV: CPT | Mod: 95

## 2024-06-12 PROCEDURE — 99213 OFFICE O/P EST LOW 20 MIN: CPT | Mod: TH

## 2024-06-13 ENCOUNTER — ASOB RESULT (OUTPATIENT)
Age: 34
End: 2024-06-13

## 2024-06-13 ENCOUNTER — APPOINTMENT (OUTPATIENT)
Dept: ANTEPARTUM | Facility: CLINIC | Age: 34
End: 2024-06-13
Payer: MEDICAID

## 2024-06-13 PROCEDURE — 76819 FETAL BIOPHYS PROFIL W/O NST: CPT

## 2024-06-16 ENCOUNTER — INPATIENT (INPATIENT)
Facility: HOSPITAL | Age: 34
LOS: 2 days | Discharge: HOME | End: 2024-06-19
Attending: STUDENT IN AN ORGANIZED HEALTH CARE EDUCATION/TRAINING PROGRAM | Admitting: STUDENT IN AN ORGANIZED HEALTH CARE EDUCATION/TRAINING PROGRAM
Payer: MEDICAID

## 2024-06-16 VITALS
RESPIRATION RATE: 19 BRPM | HEART RATE: 76 BPM | DIASTOLIC BLOOD PRESSURE: 54 MMHG | SYSTOLIC BLOOD PRESSURE: 106 MMHG | TEMPERATURE: 98 F

## 2024-06-16 DIAGNOSIS — O24.419 GESTATIONAL DIABETES MELLITUS IN PREGNANCY, UNSPECIFIED CONTROL: ICD-10-CM

## 2024-06-16 LAB
BASOPHILS # BLD AUTO: 0.04 K/UL — SIGNIFICANT CHANGE UP (ref 0–0.2)
BASOPHILS NFR BLD AUTO: 0.5 % — SIGNIFICANT CHANGE UP (ref 0–2)
BLD GP AB SCN SERPL QL: NEGATIVE — SIGNIFICANT CHANGE UP
EOSINOPHIL # BLD AUTO: 0.05 K/UL — SIGNIFICANT CHANGE UP (ref 0–0.5)
EOSINOPHIL NFR BLD AUTO: 0.6 % — SIGNIFICANT CHANGE UP (ref 0–6)
GLUCOSE BLDC GLUCOMTR-MCNC: 74 MG/DL — SIGNIFICANT CHANGE UP (ref 70–99)
HCT VFR BLD CALC: 37.4 % — SIGNIFICANT CHANGE UP (ref 34.5–45)
HGB BLD-MCNC: 12.6 G/DL — SIGNIFICANT CHANGE UP (ref 11.5–15.5)
IANC: 5.79 K/UL — SIGNIFICANT CHANGE UP (ref 1.8–7.4)
IMM GRANULOCYTES NFR BLD AUTO: 0.4 % — SIGNIFICANT CHANGE UP (ref 0–0.9)
LYMPHOCYTES # BLD AUTO: 2.14 K/UL — SIGNIFICANT CHANGE UP (ref 1–3.3)
LYMPHOCYTES # BLD AUTO: 25 % — SIGNIFICANT CHANGE UP (ref 13–44)
MCHC RBC-ENTMCNC: 30.4 PG — SIGNIFICANT CHANGE UP (ref 27–34)
MCHC RBC-ENTMCNC: 33.7 GM/DL — SIGNIFICANT CHANGE UP (ref 32–36)
MCV RBC AUTO: 90.1 FL — SIGNIFICANT CHANGE UP (ref 80–100)
MONOCYTES # BLD AUTO: 0.51 K/UL — SIGNIFICANT CHANGE UP (ref 0–0.9)
MONOCYTES NFR BLD AUTO: 6 % — SIGNIFICANT CHANGE UP (ref 2–14)
NEUTROPHILS # BLD AUTO: 5.79 K/UL — SIGNIFICANT CHANGE UP (ref 1.8–7.4)
NEUTROPHILS NFR BLD AUTO: 67.5 % — SIGNIFICANT CHANGE UP (ref 43–77)
NRBC # BLD: 0 /100 WBCS — SIGNIFICANT CHANGE UP (ref 0–0)
NRBC # FLD: 0 K/UL — SIGNIFICANT CHANGE UP (ref 0–0)
PLATELET # BLD AUTO: 182 K/UL — SIGNIFICANT CHANGE UP (ref 150–400)
RBC # BLD: 4.15 M/UL — SIGNIFICANT CHANGE UP (ref 3.8–5.2)
RBC # FLD: 14.9 % — HIGH (ref 10.3–14.5)
RH IG SCN BLD-IMP: POSITIVE — SIGNIFICANT CHANGE UP
WBC # BLD: 8.56 K/UL — SIGNIFICANT CHANGE UP (ref 3.8–10.5)
WBC # FLD AUTO: 8.56 K/UL — SIGNIFICANT CHANGE UP (ref 3.8–10.5)

## 2024-06-16 RX ORDER — DEXTROSE MONOHYDRATE AND SODIUM CHLORIDE 5; .3 G/100ML; G/100ML
1000 INJECTION, SOLUTION INTRAVENOUS
Refills: 0 | Status: DISCONTINUED | OUTPATIENT
Start: 2024-06-16 | End: 2024-06-16

## 2024-06-16 RX ORDER — SODIUM CHLORIDE 0.9 % (FLUSH) 0.9 %
1000 SYRINGE (ML) INJECTION
Refills: 0 | Status: DISCONTINUED | OUTPATIENT
Start: 2024-06-16 | End: 2024-06-17

## 2024-06-16 RX ORDER — TRISODIUM CITRATE DIHYDRATE AND CITRIC ACID MONOHYDRATE 500; 334 MG/5ML; MG/5ML
15 SOLUTION ORAL EVERY 6 HOURS
Refills: 0 | Status: DISCONTINUED | OUTPATIENT
Start: 2024-06-16 | End: 2024-06-17

## 2024-06-16 RX ORDER — OXYTOCIN 30 [USP'U]/500ML
333.33 INJECTION, SOLUTION INTRAVENOUS
Qty: 20 | Refills: 0 | Status: DISCONTINUED | OUTPATIENT
Start: 2024-06-16 | End: 2024-06-17

## 2024-06-16 RX ORDER — DEXTROSE MONOHYDRATE AND SODIUM CHLORIDE 5; .3 G/100ML; G/100ML
1000 INJECTION, SOLUTION INTRAVENOUS
Refills: 0 | Status: DISCONTINUED | OUTPATIENT
Start: 2024-06-16 | End: 2024-06-17

## 2024-06-16 RX ADMIN — Medication 1 APPLICATION(S): at 20:41

## 2024-06-16 RX ADMIN — DEXTROSE MONOHYDRATE AND SODIUM CHLORIDE 125 MILLILITER(S): 5; .3 INJECTION, SOLUTION INTRAVENOUS at 20:40

## 2024-06-16 RX ADMIN — Medication 125 MILLILITER(S): at 20:40

## 2024-06-17 ENCOUNTER — APPOINTMENT (OUTPATIENT)
Dept: ANTEPARTUM | Facility: CLINIC | Age: 34
End: 2024-06-17

## 2024-06-17 ENCOUNTER — TRANSCRIPTION ENCOUNTER (OUTPATIENT)
Age: 34
End: 2024-06-17

## 2024-06-17 LAB
GLUCOSE BLDC GLUCOMTR-MCNC: 89 MG/DL — SIGNIFICANT CHANGE UP (ref 70–99)
GLUCOSE BLDC GLUCOMTR-MCNC: 90 MG/DL — SIGNIFICANT CHANGE UP (ref 70–99)
GLUCOSE BLDC GLUCOMTR-MCNC: 93 MG/DL — SIGNIFICANT CHANGE UP (ref 70–99)
GLUCOSE BLDC GLUCOMTR-MCNC: 99 MG/DL — SIGNIFICANT CHANGE UP (ref 70–99)
T PALLIDUM AB TITR SER: NEGATIVE — SIGNIFICANT CHANGE UP

## 2024-06-17 PROCEDURE — 59409 OBSTETRICAL CARE: CPT | Mod: U9,GC

## 2024-06-17 RX ORDER — DIPHENHYDRAMINE HCL 12.5MG/5ML
25 ELIXIR ORAL EVERY 6 HOURS
Refills: 0 | Status: DISCONTINUED | OUTPATIENT
Start: 2024-06-17 | End: 2024-06-19

## 2024-06-17 RX ORDER — KETOROLAC TROMETHAMINE 30 MG/ML
30 INJECTION, SOLUTION INTRAMUSCULAR ONCE
Refills: 0 | Status: DISCONTINUED | OUTPATIENT
Start: 2024-06-17 | End: 2024-06-17

## 2024-06-17 RX ORDER — OXYTOCIN 30 [USP'U]/500ML
41.67 INJECTION, SOLUTION INTRAVENOUS
Qty: 20 | Refills: 0 | Status: DISCONTINUED | OUTPATIENT
Start: 2024-06-17 | End: 2024-06-18

## 2024-06-17 RX ORDER — ACETAMINOPHEN 325 MG
3 TABLET ORAL
Qty: 0 | Refills: 0 | DISCHARGE
Start: 2024-06-17

## 2024-06-17 RX ORDER — BENZOCAINE 15 %
1 LIQUID (ML) TOPICAL EVERY 6 HOURS
Refills: 0 | Status: DISCONTINUED | OUTPATIENT
Start: 2024-06-17 | End: 2024-06-19

## 2024-06-17 RX ORDER — DIBUCAINE 1 %
1 OINTMENT (GRAM) TOPICAL EVERY 6 HOURS
Refills: 0 | Status: DISCONTINUED | OUTPATIENT
Start: 2024-06-17 | End: 2024-06-19

## 2024-06-17 RX ORDER — LANOLIN
1 WAX (GRAM) MISCELLANEOUS EVERY 6 HOURS
Refills: 0 | Status: DISCONTINUED | OUTPATIENT
Start: 2024-06-17 | End: 2024-06-19

## 2024-06-17 RX ORDER — TETANUS TOXOID, REDUCED DIPHTHERIA TOXOID AND ACELLULAR PERTUSSIS VACCINE, ADSORBED 5; 2.5; 8; 8; 2.5 [IU]/.5ML; [IU]/.5ML; UG/.5ML; UG/.5ML; UG/.5ML
0.5 SUSPENSION INTRAMUSCULAR ONCE
Refills: 0 | Status: DISCONTINUED | OUTPATIENT
Start: 2024-06-17 | End: 2024-06-19

## 2024-06-17 RX ORDER — INSULIN ASPART 100 [IU]/ML
0 INJECTION, SOLUTION SUBCUTANEOUS
Refills: 0 | DISCHARGE

## 2024-06-17 RX ORDER — HYDROCORTISONE ACETATE 1 %
1 OINTMENT (GRAM) RECTAL EVERY 4 HOURS
Refills: 0 | Status: DISCONTINUED | OUTPATIENT
Start: 2024-06-17 | End: 2024-06-19

## 2024-06-17 RX ORDER — SIMETHICONE 40MG/0.6ML
80 SUSPENSION, DROPS(FINAL DOSAGE FORM)(ML) ORAL EVERY 4 HOURS
Refills: 0 | Status: DISCONTINUED | OUTPATIENT
Start: 2024-06-17 | End: 2024-06-19

## 2024-06-17 RX ORDER — ACETAMINOPHEN 325 MG
975 TABLET ORAL
Refills: 0 | Status: DISCONTINUED | OUTPATIENT
Start: 2024-06-17 | End: 2024-06-19

## 2024-06-17 RX ORDER — OXYTOCIN 30 [USP'U]/500ML
6 INJECTION, SOLUTION INTRAVENOUS
Qty: 30 | Refills: 0 | Status: DISCONTINUED | OUTPATIENT
Start: 2024-06-17 | End: 2024-06-17

## 2024-06-17 RX ORDER — OXYCODONE HYDROCHLORIDE 100 MG/5ML
5 SOLUTION ORAL
Refills: 0 | Status: DISCONTINUED | OUTPATIENT
Start: 2024-06-17 | End: 2024-06-19

## 2024-06-17 RX ORDER — HYDROCORTISONE VALERATE 0.2 %
1 CREAM (GRAM) TOPICAL EVERY 6 HOURS
Refills: 0 | Status: DISCONTINUED | OUTPATIENT
Start: 2024-06-17 | End: 2024-06-19

## 2024-06-17 RX ORDER — PRENATAL VIT/IRON FUM/FOLIC AC 60 MG-1 MG
1 TABLET ORAL DAILY
Refills: 0 | Status: DISCONTINUED | OUTPATIENT
Start: 2024-06-17 | End: 2024-06-19

## 2024-06-17 RX ORDER — OXYCODONE HYDROCHLORIDE 100 MG/5ML
5 SOLUTION ORAL ONCE
Refills: 0 | Status: DISCONTINUED | OUTPATIENT
Start: 2024-06-17 | End: 2024-06-19

## 2024-06-17 RX ORDER — PRAMOXINE HCL 1 %
1 CREAM (GRAM) RECTAL EVERY 4 HOURS
Refills: 0 | Status: DISCONTINUED | OUTPATIENT
Start: 2024-06-17 | End: 2024-06-19

## 2024-06-17 RX ORDER — SODIUM CHLORIDE 0.9 % (FLUSH) 0.9 %
3 SYRINGE (ML) INJECTION EVERY 8 HOURS
Refills: 0 | Status: DISCONTINUED | OUTPATIENT
Start: 2024-06-17 | End: 2024-06-19

## 2024-06-17 RX ORDER — ONDANSETRON HYDROCHLORIDE 2 MG/ML
4 INJECTION INTRAMUSCULAR; INTRAVENOUS ONCE
Refills: 0 | Status: COMPLETED | OUTPATIENT
Start: 2024-06-17 | End: 2024-06-17

## 2024-06-17 RX ADMIN — Medication 600 MILLIGRAM(S): at 21:35

## 2024-06-17 RX ADMIN — Medication 80 MILLIGRAM(S): at 21:35

## 2024-06-17 RX ADMIN — Medication 975 MILLIGRAM(S): at 17:45

## 2024-06-17 RX ADMIN — Medication 975 MILLIGRAM(S): at 23:59

## 2024-06-17 RX ADMIN — KETOROLAC TROMETHAMINE 30 MILLIGRAM(S): 30 INJECTION, SOLUTION INTRAMUSCULAR at 16:02

## 2024-06-17 RX ADMIN — Medication 600 MILLIGRAM(S): at 22:05

## 2024-06-17 RX ADMIN — Medication 30 MILLILITER(S): at 21:35

## 2024-06-17 RX ADMIN — Medication 1 APPLICATION(S): at 12:37

## 2024-06-17 RX ADMIN — DEXTROSE MONOHYDRATE AND SODIUM CHLORIDE 125 MILLILITER(S): 5; .3 INJECTION, SOLUTION INTRAVENOUS at 12:37

## 2024-06-17 RX ADMIN — KETOROLAC TROMETHAMINE 30 MILLIGRAM(S): 30 INJECTION, SOLUTION INTRAMUSCULAR at 15:36

## 2024-06-17 RX ADMIN — Medication 975 MILLIGRAM(S): at 17:08

## 2024-06-17 RX ADMIN — OXYTOCIN 6 MILLIUNIT(S)/MIN: 30 INJECTION, SOLUTION INTRAVENOUS at 12:37

## 2024-06-17 RX ADMIN — ONDANSETRON HYDROCHLORIDE 4 MILLIGRAM(S): 2 INJECTION INTRAMUSCULAR; INTRAVENOUS at 13:20

## 2024-06-17 RX ADMIN — OXYTOCIN 125 MILLIUNIT(S)/MIN: 30 INJECTION, SOLUTION INTRAVENOUS at 17:09

## 2024-06-17 RX ADMIN — Medication 3 MILLILITER(S): at 22:57

## 2024-06-17 RX ADMIN — Medication 125 MILLILITER(S): at 12:37

## 2024-06-18 RX ORDER — SENNOSIDES 8.6 MG
1 TABLET ORAL
Refills: 0 | Status: DISCONTINUED | OUTPATIENT
Start: 2024-06-18 | End: 2024-06-19

## 2024-06-18 RX ADMIN — Medication 600 MILLIGRAM(S): at 03:30

## 2024-06-18 RX ADMIN — Medication 1 TABLET(S): at 21:27

## 2024-06-18 RX ADMIN — Medication 975 MILLIGRAM(S): at 00:30

## 2024-06-18 RX ADMIN — Medication 975 MILLIGRAM(S): at 21:25

## 2024-06-18 RX ADMIN — Medication 600 MILLIGRAM(S): at 18:27

## 2024-06-18 RX ADMIN — Medication 975 MILLIGRAM(S): at 05:42

## 2024-06-18 RX ADMIN — Medication 975 MILLIGRAM(S): at 21:55

## 2024-06-18 RX ADMIN — Medication 1 TABLET(S): at 11:47

## 2024-06-18 RX ADMIN — Medication 600 MILLIGRAM(S): at 07:56

## 2024-06-18 RX ADMIN — Medication 3 MILLILITER(S): at 13:36

## 2024-06-18 RX ADMIN — Medication 975 MILLIGRAM(S): at 12:30

## 2024-06-18 RX ADMIN — Medication 80 MILLIGRAM(S): at 05:42

## 2024-06-18 RX ADMIN — Medication 600 MILLIGRAM(S): at 17:45

## 2024-06-18 RX ADMIN — Medication 975 MILLIGRAM(S): at 11:42

## 2024-06-18 RX ADMIN — Medication 600 MILLIGRAM(S): at 08:33

## 2024-06-18 RX ADMIN — Medication 3 MILLILITER(S): at 22:00

## 2024-06-18 RX ADMIN — Medication 600 MILLIGRAM(S): at 02:57

## 2024-06-18 RX ADMIN — Medication 3 MILLILITER(S): at 06:04

## 2024-06-18 RX ADMIN — Medication 975 MILLIGRAM(S): at 06:20

## 2024-06-19 VITALS
RESPIRATION RATE: 18 BRPM | DIASTOLIC BLOOD PRESSURE: 57 MMHG | SYSTOLIC BLOOD PRESSURE: 101 MMHG | HEART RATE: 62 BPM | OXYGEN SATURATION: 99 % | TEMPERATURE: 98 F

## 2024-06-19 RX ORDER — PRENATAL VIT/IRON FUM/FOLIC AC 60 MG-1 MG
1 TABLET ORAL
Qty: 0 | Refills: 0 | DISCHARGE
Start: 2024-06-19

## 2024-06-19 RX ADMIN — Medication 1 SPRAY(S): at 09:26

## 2024-06-19 RX ADMIN — Medication 1 APPLICATION(S): at 09:26

## 2024-06-19 RX ADMIN — Medication 975 MILLIGRAM(S): at 10:57

## 2024-06-19 RX ADMIN — Medication 1 APPLICATION(S): at 09:27

## 2024-06-19 RX ADMIN — Medication 80 MILLIGRAM(S): at 06:07

## 2024-06-19 RX ADMIN — Medication 600 MILLIGRAM(S): at 00:27

## 2024-06-19 RX ADMIN — Medication 600 MILLIGRAM(S): at 06:06

## 2024-06-19 RX ADMIN — Medication 80 MILLIGRAM(S): at 09:25

## 2024-06-19 RX ADMIN — Medication 1 TABLET(S): at 11:59

## 2024-06-19 RX ADMIN — Medication 1 TABLET(S): at 09:25

## 2024-06-19 RX ADMIN — Medication 30 MILLILITER(S): at 09:24

## 2024-06-19 RX ADMIN — Medication 975 MILLIGRAM(S): at 09:12

## 2024-06-19 RX ADMIN — Medication 600 MILLIGRAM(S): at 11:59

## 2024-06-19 RX ADMIN — Medication 600 MILLIGRAM(S): at 06:36

## 2024-06-19 RX ADMIN — Medication 600 MILLIGRAM(S): at 00:57

## 2024-06-19 RX ADMIN — Medication 3 MILLILITER(S): at 05:00

## 2024-06-19 RX ADMIN — Medication 600 MILLIGRAM(S): at 12:30

## 2024-06-20 ENCOUNTER — APPOINTMENT (OUTPATIENT)
Dept: ANTEPARTUM | Facility: CLINIC | Age: 34
End: 2024-06-20

## 2024-06-21 ENCOUNTER — TRANSCRIPTION ENCOUNTER (OUTPATIENT)
Age: 34
End: 2024-06-21

## 2024-06-24 ENCOUNTER — APPOINTMENT (OUTPATIENT)
Dept: ANTEPARTUM | Facility: CLINIC | Age: 34
End: 2024-06-24

## 2024-06-24 ENCOUNTER — RESULT CHARGE (OUTPATIENT)
Age: 34
End: 2024-06-24

## 2024-06-25 ENCOUNTER — APPOINTMENT (OUTPATIENT)
Dept: OBGYN | Facility: CLINIC | Age: 34
End: 2024-06-25
Payer: MEDICAID

## 2024-06-25 VITALS
SYSTOLIC BLOOD PRESSURE: 101 MMHG | HEART RATE: 68 BPM | RESPIRATION RATE: 16 BRPM | DIASTOLIC BLOOD PRESSURE: 68 MMHG | TEMPERATURE: 97.5 F

## 2024-06-25 DIAGNOSIS — R39.9 UNSPECIFIED SYMPTOMS AND SIGNS INVOLVING THE GENITOURINARY SYSTEM: ICD-10-CM

## 2024-06-25 PROCEDURE — 81007 URINE SCREEN FOR BACTERIA: CPT | Mod: QW

## 2024-06-25 PROCEDURE — 99212 OFFICE O/P EST SF 10 MIN: CPT | Mod: 25

## 2024-06-27 ENCOUNTER — APPOINTMENT (OUTPATIENT)
Dept: ANTEPARTUM | Facility: CLINIC | Age: 34
End: 2024-06-27

## 2024-06-28 LAB — BACTERIA UR CULT: NORMAL

## 2024-07-24 ENCOUNTER — APPOINTMENT (OUTPATIENT)
Dept: OBGYN | Facility: CLINIC | Age: 34
End: 2024-07-24
Payer: MEDICAID

## 2024-07-24 VITALS — DIASTOLIC BLOOD PRESSURE: 66 MMHG | SYSTOLIC BLOOD PRESSURE: 95 MMHG | HEIGHT: 61 IN | HEART RATE: 101 BPM

## 2024-07-24 PROCEDURE — 99213 OFFICE O/P EST LOW 20 MIN: CPT | Mod: TH

## 2024-07-25 ENCOUNTER — TRANSCRIPTION ENCOUNTER (OUTPATIENT)
Age: 34
End: 2024-07-25

## 2024-07-25 DIAGNOSIS — N61.0 MASTITIS WITHOUT ABSCESS: ICD-10-CM

## 2024-07-25 RX ORDER — DICLOXACILLIN SODIUM 500 MG/1
500 CAPSULE ORAL 4 TIMES DAILY
Qty: 28 | Refills: 0 | Status: ACTIVE | COMMUNITY
Start: 2024-07-25 | End: 1900-01-01

## 2024-07-28 NOTE — HISTORY OF PRESENT ILLNESS
[FreeTextEntry1] :  33 year old P2 presents 5 weeks post-partum for a follow up appointment regarding breast pain. Pt c/o breast pain, fevers, chills, and aches.

## 2024-07-28 NOTE — PHYSICAL EXAM
[Chaperone Present] : A chaperone was present in the examining room during all aspects of the physical examination [Mass] : mass [Enlargement Of The Right Breast] : swelling [Tenderness Of The Right Breast] : tenderness [FreeTextEntry2] : Nurse [FreeTextEntry6] : Right breast swollen and engorged, palpable lump

## 2024-07-28 NOTE — PLAN
[FreeTextEntry1] :  JEANNIE is a 33 year old female presenting for a follow up regarding breast pain.   Discussed likely mastitis.  - Rx given for Dicloxacillin

## 2024-07-28 NOTE — DISCUSSION/SUMMARY
[FreeTextEntry1] :  This note was written by Shila Ward on 07/24/2024 actively solely Dr. Jonathan Burleson M.D.   All medical record entries made by this scribe at my, Dr. Jonathan Burleson M.D. direction and personally dictated by me on 07/24/2024. I have personally reviewed the chart and agree that the record reflects my personal performance of the history, physical exam, assessment, and plan.

## 2024-07-31 ENCOUNTER — APPOINTMENT (OUTPATIENT)
Dept: OBGYN | Facility: CLINIC | Age: 34
End: 2024-07-31